# Patient Record
Sex: FEMALE | Race: WHITE | Employment: OTHER | URBAN - METROPOLITAN AREA
[De-identification: names, ages, dates, MRNs, and addresses within clinical notes are randomized per-mention and may not be internally consistent; named-entity substitution may affect disease eponyms.]

---

## 2018-01-09 ENCOUNTER — OFFICE VISIT (OUTPATIENT)
Dept: CARDIOLOGY CLINIC | Age: 68
End: 2018-01-09

## 2018-01-09 VITALS
WEIGHT: 136 LBS | DIASTOLIC BLOOD PRESSURE: 68 MMHG | OXYGEN SATURATION: 98 % | RESPIRATION RATE: 16 BRPM | HEIGHT: 63 IN | HEART RATE: 76 BPM | BODY MASS INDEX: 24.1 KG/M2 | SYSTOLIC BLOOD PRESSURE: 110 MMHG

## 2018-01-09 DIAGNOSIS — I10 ESSENTIAL HYPERTENSION: Primary | ICD-10-CM

## 2018-01-09 NOTE — PROGRESS NOTES
LAST OFFICE VISIT : Visit date not found        ICD-10-CM ICD-9-CM   1. Essential hypertension I10 401.9            Lara Florentino is a 70 y.o. female with diabetes, hypertension and dyslipidemia referred for  1 year follow up. Cardiac risk factors: family history, dyslipidemia, diabetes mellitus, hypertension, post-menopausal  I have personally obtained the history from the patient. HISTORY OF PRESENTING ILLNESS     Overall the pt states she is doing well. The pt reports that she has baseline shortness of breath due to her COPD. She states that she is not using oxygen at home. The pt has lost weight since her last visit and her sugars have been under good control. She notes that she is living with her newly found daughter and is very happy. The patient denies chest pain, orthopnea, PND, LE edema, palpitations, syncope, presyncope or fatigue.          ACTIVE PROBLEM LIST     Patient Active Problem List    Diagnosis Date Noted    Abnormal mammogram with microcalcification 03/23/2015           PAST MEDICAL HISTORY     Past Medical History:   Diagnosis Date    Arthritis     hands    Chronic obstructive pulmonary disease ([address]     Diabetes [facility] [address]     type 2    GERD (gastroesophageal reflux disease)     Hypertension            PAST SURGICAL HISTORY     Past Surgical History:   Procedure Laterality Date    BREAST SURGERY PROCEDURE UNLISTED Left 3/26/15    left breast biopsy    COLONOSCOPY N/A 6/2/2016    COLONOSCOPY performed by Tremaine Bryant MD at [facility] HX BREAST BIOPSY Left 4/7/2015    LEFT BREAST BIOPSY WITH ULTRASOUND performed by Taras Mack MD at [address] HX MOHS PROCEDURES Right 2000    HX ORTHOPAEDIC Left     hand surgery left hand    HX ORTHOPAEDIC Right     CTR    HX ORTHOPAEDIC Right     trigger finger release    HX OTHER SURGICAL  2000    Polyps removed from vocal cords    HX TONSILLECTOMY      as child     [facility] Allergies   Allergen Reactions    Sulfa (Sulfonamide Antibiotics) Itching          FAMILY HISTORY     History reviewed. No pertinent family history. negative for cardiac disease       SOCIAL HISTORY     Social History     Social History    Marital status:      Spouse name: N/A    Number of children: N/A    Years of education: N/A     Social History Main Topics    Smoking status: Former Smoker     Packs/day: 1.50     Years: 30.00     Types: Cigarettes     Quit date: 12/13/2012    Smokeless tobacco: Never Used    Alcohol use No    Drug use: No    Sexual activity: No     Other Topics Concern    None     Social History Narrative         MEDICATIONS     Current Outpatient Prescriptions   Medication Sig    aspirin delayed-release 81 mg tablet Take 81 mg by mouth daily.  atorvastatin (LIPITOR) 10 mg tablet Take 10 mg by mouth nightly.  metFORMIN (GLUCOPHAGE) 1,000 mg tablet Take 1,000 mg by mouth two (2) times daily (with meals).  tiotropium (SPIRIVA WITH HANDIHALER) 18 mcg inhalation capsule Take 1 Cap by inhalation nightly.  fluticasone-salmeterol (ADVAIR DISKUS) 250-50 mcg/dose diskus inhaler Take 1 Puff by inhalation every twelve (12) hours.  albuterol (PROAIR HFA) 90 mcg/actuation inhaler Take  by inhalation.  lisinopril-hydrochlorothiazide (PRINZIDE, ZESTORETIC) 20-12.5 mg per tablet Take 1 Tab by mouth Daily (before breakfast).  citalopram (CELEXA) 20 mg tablet Take 20 mg by mouth Daily (before breakfast).  DOXYLAMINE SUCCINATE (SLEEP AID, DOXYLAMINE, PO) Take 50 mg by mouth nightly.  omeprazole (PRILOSEC) 20 mg capsule Take 20 mg by mouth nightly. No current facility-administered medications for this visit. I have reviewed the nurses notes, vitals, problem list, allergy list, medical history, family, social history and medications. REVIEW OF SYMPTOMS      General: Pt denies excessive weight gain or loss.  Pt is able to conduct ADL's  HEENT: Denies blurred vision, headaches, hearing loss, epistaxis and difficulty swallowing. Respiratory: Denies cough, congestion, shortness of breath, JIMENES, wheezing or stridor. Cardiovascular: Denies precordial pain, palpitations, edema or PND  Gastrointestinal: Denies poor appetite, indigestion, abdominal pain or blood in stool  Genitourinary: Denies hematuria, dysuria, increased urinary frequency  Musculoskeletal: Denies joint pain or swelling from muscles or joints  Neurologic: Denies tremor, paresthesias, headache, or sensory motor disturbance  Psychiatric: Denies confusion, insomnia, depression  Integumentray: Denies rash, itching or ulcers. Hematologic: Denies easy bruising, bleeding     PHYSICAL EXAMINATION      Vitals:    01/09/18 1358   BP: 110/68   Pulse: 76   Resp: 16   SpO2: 98%   Weight: 136 lb (61.7 kg)   Height: 5' 3\" (1.6 m)     General: Well developed, in no acute distress. HEENT: No jaundice, oral mucosa moist, no oral ulcers  Neck: Supple, no stiffness, no lymphadenopathy, supple  Heart:  Normal S1/S2 negative S3 or S4. Regular, no murmur, gallop or rub, no jugular venous distention  Respiratory: Clear bilaterally x 4, no wheezing or rales  Extremities:  No edema, normal cap refill, no cyanosis. Musculoskeletal: No clubbing, no deformities  Neuro: A&Ox3, speech clear, gait stable, cooperative, no focal neurologic deficits  Skin: Skin color is normal. No rashes or lesions. Non diaphoretic, moist.        EKG: NSR with non-specific STT changes. DIAGNOSTIC DATA     1. Lipids  5/20/16- , HDL 59, LDL 86,     2. Echo  9/20/13- EF 55%    3.  Lexiscan  3/2/15- no ischemia         LABORATORY DATA            Lab Results   Component Value Date/Time    WBC 8.0 04/01/2015 09:01 AM    HGB 12.4 04/01/2015 09:01 AM    HCT 37.7 04/01/2015 09:01 AM    PLATELET 820 58/01/8625 09:01 AM    MCV 87.1 04/01/2015 09:01 AM      Lab Results   Component Value Date/Time    Sodium 139 04/01/2015 09:01 AM    Potassium 5.5 04/01/2015 09:01 AM    Chloride 103 04/01/2015 09:01 AM    CO2 28 04/01/2015 09:01 AM    Anion gap 8 04/01/2015 09:01 AM    Glucose 118 04/01/2015 09:01 AM    BUN 27 04/01/2015 09:01 AM    Creatinine 0.84 04/01/2015 09:01 AM    BUN/Creatinine ratio 32 04/01/2015 09:01 AM    GFR est AA >60 04/01/2015 09:01 AM    GFR est non-AA >60 04/01/2015 09:01 AM    Calcium 9.0 04/01/2015 09:01 AM           ASSESSMENT/RECOMMENDATIONS:.      1. Dyspnea/Emphysema  - She is being followed by pulmonary and most of her shortness of breath is non-cardiac related. 2. Dyslipidemia  - Lipids are being followed by Judson Brennan MD and have been at goal.  3. Hypertension  - BP is well controlled in clinic today. I would not make any adjustments to her antihypertensives at this time. I counseled her to continue to exercise and eat a low sodium diet. 4. Diabetes mellitus   - Last HGB A1C was 6.4%, goal should be 6% or below. 5. Return in 1 year or PRN. Orders Placed This Encounter    AMB POC EKG ROUTINE W/ 12 LEADS, INTER & REP     Order Specific Question:   Reason for Exam:     Answer:   htn        Follow-up Disposition:  Return in about 1 year (around 1/9/2019). I have discussed the diagnosis with  Shahnaz Jacome and the intended plan as seen in the above orders. Questions were answered concerning future plans. I have discussed medication side effects and warnings with the patient as well. Thank you,  Judson Brennan MD for involving me in the care of  Shahnaz Jacome. Please do not hesitate to contact me for further questions/concerns. Written by Varinder Bear, as dictated by Evan South MD.     Ondina Rider MD, [facility] [address]      [facility], [address] [place] [address] Drive      [phone] / [phone] Fax

## 2018-01-09 NOTE — PROGRESS NOTES
Chief Complaint   Patient presents with    Follow-up     Htn, elevated lipids. 1. Have you been to the ER, urgent care clinic since your last visit? Hospitalized since your last visit? No    2. Have you seen or consulted any other health care providers outside of the [address] [place] [address] since your last visit? Include any pap smears or colon screening.  No

## 2020-02-21 NOTE — PROGRESS NOTES
LAST OFFICE VISIT : Visit date not found        ICD-10-CM ICD-9-CM   1. Hypertension, unspecified type I10 401.9            Miguel Angel Garrido is a 69 y.o. female with DM, HTN, and dyslipidemia referred for follow up. Cardiac risk factors: family history, dyslipidemia, diabetes mellitus, hypertension, post-menopausal  I have personally obtained the history from the patient. HISTORY OF PRESENTING ILLNESS     Pt is on oxygen. Pt has been on oxygen for about 6-7 months. From a cardiac standpoint, pt is doing well. Pt states she had SOB and saw a pulmonologist in the [facility]. She states the pulmonologist treated with with antibiotics and steroids for a lung infection, but she states she did not have a lung infection, instead she actually had COPD and emphysema. Pt states that during this process she went down to 115 lbs because she was not able to eat and breath at the same time. Pt has been taken off of her DM medication. The patient denies chest pain/ shortness of breath, orthopnea, PND, LE edema, palpitations, syncope, presyncope or fatigue.          ACTIVE PROBLEM LIST     Patient Active Problem List    Diagnosis Date Noted    Abnormal mammogram with microcalcification 03/23/2015           PAST MEDICAL HISTORY     Past Medical History:   Diagnosis Date    Arthritis     hands    Chronic obstructive pulmonary disease ([address]     Diabetes ([facility] [address]     type 2    GERD (gastroesophageal reflux disease)     Hypertension            PAST SURGICAL HISTORY     Past Surgical History:   Procedure Laterality Date    BREAST SURGERY PROCEDURE UNLISTED Left 3/26/15    left breast biopsy    COLONOSCOPY N/A 6/2/2016    COLONOSCOPY performed by Norah Burnham MD at [facility] HX BREAST BIOPSY Left 4/7/2015    LEFT BREAST BIOPSY WITH ULTRASOUND performed by Leroy Chi MD at [address] HX MOHS PROCEDURES Right 2000    HX ORTHOPAEDIC Left     hand surgery left hand    HX ORTHOPAEDIC Right     CTR  HX ORTHOPAEDIC Right     trigger finger release    HX OTHER SURGICAL  2000    Polyps removed from vocal cords    HX TONSILLECTOMY      as child    HX TUBAL LIGATION            ALLERGIES     Allergies   Allergen Reactions    Sulfa (Sulfonamide Antibiotics) Itching          FAMILY HISTORY     No family history on file. negative for cardiac disease       SOCIAL HISTORY     Social History     Socioeconomic History    Marital status:      Spouse name: Not on file    Number of children: Not on file    Years of education: Not on file    Highest education level: Not on file   Tobacco Use    Smoking status: Former Smoker     Packs/day: 2.00     Years: 40.00     Pack years: 80.00     Types: Cigarettes     Last attempt to quit: 2012     Years since quittin.2    Smokeless tobacco: Never Used    Tobacco comment: 80 pack year history. Substance and Sexual Activity    Alcohol use: No    Drug use: No    Sexual activity: Never         MEDICATIONS     Current Outpatient Medications   Medication Sig    hydrOXYzine pamoate (VISTARIL) 25 mg capsule Take 25 mg by mouth three (3) times daily as needed for Itching.  fluticasone-umeclidinium-vilanterol (TRELEGY ELLIPTA) 100-62.5-25 mcg inhaler Take 1 Puff by inhalation daily.  Oxygen 2 Each.  atorvastatin (LIPITOR) 10 mg tablet Take 10 mg by mouth nightly.  albuterol (PROAIR HFA) 90 mcg/actuation inhaler Take  by inhalation.  lisinopril-hydrochlorothiazide (PRINZIDE, ZESTORETIC) 20-12.5 mg per tablet Take 1 Tab by mouth Daily (before breakfast).  citalopram (CELEXA) 20 mg tablet Take 20 mg by mouth Daily (before breakfast).  aspirin delayed-release 81 mg tablet Take 81 mg by mouth daily.  DOXYLAMINE SUCCINATE (SLEEP AID, DOXYLAMINE, PO) Take 50 mg by mouth nightly.  metFORMIN (GLUCOPHAGE) 1,000 mg tablet Take 1,000 mg by mouth two (2) times daily (with meals).     tiotropium (SPIRIVA WITH HANDIHALER) 18 mcg inhalation capsule Take 1 Cap by inhalation nightly.  fluticasone-salmeterol (ADVAIR DISKUS) 250-50 mcg/dose diskus inhaler Take 1 Puff by inhalation every twelve (12) hours.  omeprazole (PRILOSEC) 20 mg capsule Take 20 mg by mouth nightly. No current facility-administered medications for this visit. I have reviewed the nurses notes, vitals, problem list, allergy list, medical history, family, social history and medications. REVIEW OF SYMPTOMS      General: Pt denies excessive weight gain or loss. Pt is able to conduct ADL's  HEENT: Denies blurred vision, headaches, hearing loss, epistaxis and difficulty swallowing. Respiratory: Denies cough, congestion, JIMENES, wheezing or stridor. Positive for SOB  Cardiovascular: Denies precordial pain, palpitations, edema or PND  Gastrointestinal: Denies poor appetite, indigestion, abdominal pain or blood in stool  Genitourinary: Denies hematuria, dysuria, increased urinary frequency  Musculoskeletal: Denies joint pain or swelling from muscles or joints  Neurologic: Denies tremor, paresthesias, headache, or sensory motor disturbance  Psychiatric: Denies confusion, insomnia, depression  Integumentray: Denies rash, itching or ulcers. Hematologic: Denies easy bruising, bleeding     PHYSICAL EXAMINATION      Vitals:    02/24/20 1025   BP: 128/68   Pulse: 73   SpO2: 98%   Weight: 125 lb (56.7 kg)   Height: 5' 2\" (1.575 m)     General: Well developed, in no acute distress. HEENT: No jaundice, oral mucosa moist, no oral ulcers  Neck: Supple, no stiffness, no lymphadenopathy, supple  Heart:  Normal S1/S2 negative S3 or S4. Regular, no murmur, gallop or rub, no jugular venous distention  Respiratory: Clear bilaterally x 4, no wheezing or rales  Abdomen:   Soft, non-tender, bowel sounds are active. Extremities:  No edema, normal cap refill, no cyanosis.   Musculoskeletal: No clubbing, no deformities  Neuro: A&Ox3, speech clear, gait stable, cooperative, no focal neurologic deficits  Skin: Skin color is normal. No rashes or lesions. Non diaphoretic, moist.  Vascular: 2+ pulses symmetric in all extremities        EKG: NSR with nonspecific STT changes     DIAGNOSTIC DATA     1. Lipids  5/20/16- , HDL 59, LDL 86,   8/14/17- , HDL 62, LDL 76, Tg 140    2. Echo  9/20/13- EF 55%    3. Lexiscan  3/2/15- no ischemia         LABORATORY DATA            Lab Results   Component Value Date/Time    WBC 8.0 04/01/2015 09:01 AM    HGB 12.4 04/01/2015 09:01 AM    HCT 37.7 04/01/2015 09:01 AM    PLATELET 307 17/60/0614 09:01 AM    MCV 87.1 04/01/2015 09:01 AM      Lab Results   Component Value Date/Time    Sodium 139 04/01/2015 09:01 AM    Potassium 5.5 (H) 04/01/2015 09:01 AM    Chloride 103 04/01/2015 09:01 AM    CO2 28 04/01/2015 09:01 AM    Anion gap 8 04/01/2015 09:01 AM    Glucose 118 (H) 04/01/2015 09:01 AM    BUN 27 (H) 04/01/2015 09:01 AM    Creatinine 0.84 04/01/2015 09:01 AM    BUN/Creatinine ratio 32 (H) 04/01/2015 09:01 AM    GFR est AA >60 04/01/2015 09:01 AM    GFR est non-AA >60 04/01/2015 09:01 AM    Calcium 9.0 04/01/2015 09:01 AM           ASSESSMENT/RECOMMENDATIONS:.      1. Dyspnea/Emphysema   - Is now on home oxygen continuously   - Has emphysema   - Will check an echo to look at PA pressures   2. Dyslipidemia  - Followed by Victor Manuel Colon MD  - Lipids from 2017 were at goal. Will give her a lab slip to get her cholesterol rechecked. 3. HTN  - Blood pressure is under good control, no adjustments in antihypertensives. Continue low sodium diet. 4. DM  - HGB A1C goal should be 6 or below. 5. Return in 6 months or PRN.      Orders Placed This Encounter    HEPATIC FUNCTION PANEL     Standing Status:   Future     Standing Expiration Date:   2/24/2021    LIPID PANEL     Standing Status:   Future     Standing Expiration Date:   2/24/2021    AMB POC EKG ROUTINE W/ 12 LEADS, INTER & REP     Order Specific Question:   Reason for Exam:     Answer:   htn    hydrOXYzine pamoate (VISTARIL) 25 mg capsule     Sig: Take 25 mg by mouth three (3) times daily as needed for Itching.  fluticasone-umeclidinium-vilanterol (TRELEGY ELLIPTA) 100-62.5-25 mcg inhaler     Sig: Take 1 Puff by inhalation daily.  Oxygen     Si Each. Follow-up and Dispositions  ·   Return in about 6 months (around 2020). I have discussed the diagnosis with  Kim Warren and the intended plan as seen in the above orders. Questions were answered concerning future plans. I have discussed medication side effects and warnings with the patient as well. Thank you,  Xavi Campbell MD for involving me in the care of  Kim Warren. Please do not hesitate to contact me for further questions/concerns. Written by Aureliano Kincaid, as dictated by Dunia Colon MD.     Mason Johnson MD, [facility]    Patient Care Team:  Xavi Campbell MD as PCP - General (Family Practice)    [address], [address] [place] [address]      [phone] / [phone] Fax

## 2020-02-24 ENCOUNTER — OFFICE VISIT (OUTPATIENT)
Dept: CARDIOLOGY CLINIC | Age: 70
End: 2020-02-24

## 2020-02-24 VITALS
WEIGHT: 125 LBS | SYSTOLIC BLOOD PRESSURE: 128 MMHG | OXYGEN SATURATION: 98 % | BODY MASS INDEX: 23 KG/M2 | HEIGHT: 62 IN | HEART RATE: 73 BPM | DIASTOLIC BLOOD PRESSURE: 68 MMHG

## 2020-02-24 DIAGNOSIS — I10 HYPERTENSION, UNSPECIFIED TYPE: Primary | ICD-10-CM

## 2020-02-24 RX ORDER — HYDROXYZINE PAMOATE 25 MG/1
25 CAPSULE ORAL
COMMUNITY
End: 2021-08-17

## 2020-02-24 NOTE — LETTER
2/25/20 Patient: Catarina Martinez YOB: 1950 Date of Visit: 2/24/2020 Abner Mendenhall, [address] [facility] [address] [facility] [address] [place] [address] VIA Facsimile: [phone] Dear Abner Mendenhall DO, Thank you for referring Ms. Catarina Martinez to [facility] for evaluation. My notes for this consultation are attached. If you have questions, please do not hesitate to call me. I look forward to following your patient along with you.  
 
 
Sincerely, 
 
Eladio Curtis MD

## 2020-02-24 NOTE — PROGRESS NOTES
Chief Complaint   Patient presents with    Hypertension    Follow-up     Visit Vitals  /68 (BP 1 Location: Left arm, BP Patient Position: Sitting)   Pulse 73   Ht 5' 2\" (1.575 m)   Wt 125 lb (56.7 kg)   SpO2 98%   BMI 22.86 kg/m²     SOB JIMENES

## 2020-02-29 LAB
ALBUMIN SERPL-MCNC: 4.3 G/DL (ref 3.7–4.7)
ALP SERPL-CCNC: 60 IU/L (ref 39–117)
ALT SERPL-CCNC: 18 IU/L (ref 0–32)
AST SERPL-CCNC: 15 IU/L (ref 0–40)
BILIRUB DIRECT SERPL-MCNC: 0.11 MG/DL (ref 0–0.4)
BILIRUB SERPL-MCNC: 0.4 MG/DL (ref 0–1.2)
CHOLEST SERPL-MCNC: 155 MG/DL (ref 100–199)
HDLC SERPL-MCNC: 64 MG/DL
LDLC SERPL CALC-MCNC: 70 MG/DL (ref 0–99)
PROT SERPL-MCNC: 6.4 G/DL (ref 6–8.5)
SPECIMEN STATUS REPORT, ROLRST: NORMAL
TRIGL SERPL-MCNC: 105 MG/DL (ref 0–149)
VLDLC SERPL CALC-MCNC: 21 MG/DL (ref 5–40)

## 2020-09-29 ENCOUNTER — HOSPITAL ENCOUNTER (OUTPATIENT)
Dept: GENERAL RADIOLOGY | Age: 70
Discharge: HOME | End: 2020-09-29
Payer: MEDICARE

## 2020-09-29 ENCOUNTER — TRANSCRIBE ORDER (OUTPATIENT)
Dept: REGISTRATION | Age: 70
End: 2020-09-29

## 2020-09-29 DIAGNOSIS — J44.9 COPD (CHRONIC OBSTRUCTIVE PULMONARY DISEASE) (HCC): ICD-10-CM

## 2020-09-29 DIAGNOSIS — J44.9 COPD (CHRONIC OBSTRUCTIVE PULMONARY DISEASE) (HCC): Primary | ICD-10-CM

## 2020-09-29 PROCEDURE — 71046 X-RAY EXAM CHEST 2 VIEWS: CPT

## 2021-02-09 ENCOUNTER — TRANSCRIBE ORDER (OUTPATIENT)
Dept: SCHEDULING | Age: 71
End: 2021-02-09

## 2021-02-09 DIAGNOSIS — Z12.31 VISIT FOR SCREENING MAMMOGRAM: Primary | ICD-10-CM

## 2021-03-12 ENCOUNTER — HOSPITAL ENCOUNTER (OUTPATIENT)
Dept: MAMMOGRAPHY | Age: 71
Discharge: HOME | End: 2021-03-12
Payer: MEDICARE

## 2021-03-12 DIAGNOSIS — Z12.31 VISIT FOR SCREENING MAMMOGRAM: ICD-10-CM

## 2021-03-12 PROCEDURE — 77063 BREAST TOMOSYNTHESIS BI: CPT

## 2021-08-10 ENCOUNTER — TRANSCRIBE ORDER (OUTPATIENT)
Dept: RESPIRATORY THERAPY | Age: 71
End: 2021-08-10

## 2021-08-10 DIAGNOSIS — J44.9 CHRONIC OBSTRUCTIVE PULMONARY DISEASE, UNSPECIFIED COPD TYPE (HCC): Primary | ICD-10-CM

## 2021-08-13 ENCOUNTER — TRANSCRIBE ORDER (OUTPATIENT)
Dept: REGISTRATION | Age: 71
End: 2021-08-13

## 2021-08-13 ENCOUNTER — HOSPITAL ENCOUNTER (OUTPATIENT)
Dept: GENERAL RADIOLOGY | Age: 71
Discharge: HOME | End: 2021-08-13
Payer: MEDICARE

## 2021-08-13 ENCOUNTER — HOSPITAL ENCOUNTER (OUTPATIENT)
Dept: PULMONOLOGY | Age: 71
Discharge: HOME | End: 2021-08-13
Payer: MEDICARE

## 2021-08-13 DIAGNOSIS — J44.9 COPD (CHRONIC OBSTRUCTIVE PULMONARY DISEASE) (HCC): Primary | ICD-10-CM

## 2021-08-13 DIAGNOSIS — J44.9 COPD (CHRONIC OBSTRUCTIVE PULMONARY DISEASE) (HCC): ICD-10-CM

## 2021-08-13 LAB — HBA1C MFR BLD HPLC: 6 %

## 2021-08-13 PROCEDURE — 71046 X-RAY EXAM CHEST 2 VIEWS: CPT

## 2021-08-13 PROCEDURE — 94729 DIFFUSING CAPACITY: CPT

## 2021-08-13 PROCEDURE — 94060 EVALUATION OF WHEEZING: CPT

## 2021-08-13 PROCEDURE — 94726 PLETHYSMOGRAPHY LUNG VOLUMES: CPT

## 2021-08-16 ENCOUNTER — TRANSCRIBE ORDER (OUTPATIENT)
Dept: SCHEDULING | Age: 71
End: 2021-08-16

## 2021-08-16 DIAGNOSIS — M85.9 DISORDER OF BONE DENSITY AND STRUCTURE, UNSPECIFIED: Primary | ICD-10-CM

## 2021-08-17 ENCOUNTER — OFFICE VISIT (OUTPATIENT)
Dept: CARDIOLOGY CLINIC | Age: 71
End: 2021-08-17
Payer: MEDICARE

## 2021-08-17 VITALS
HEIGHT: 62 IN | DIASTOLIC BLOOD PRESSURE: 70 MMHG | BODY MASS INDEX: 25.21 KG/M2 | HEART RATE: 85 BPM | WEIGHT: 137 LBS | SYSTOLIC BLOOD PRESSURE: 118 MMHG

## 2021-08-17 DIAGNOSIS — I10 HYPERTENSION, UNSPECIFIED TYPE: Primary | ICD-10-CM

## 2021-08-17 PROCEDURE — G8536 NO DOC ELDER MAL SCRN: HCPCS | Performed by: SPECIALIST

## 2021-08-17 PROCEDURE — G8399 PT W/DXA RESULTS DOCUMENT: HCPCS | Performed by: SPECIALIST

## 2021-08-17 PROCEDURE — 1101F PT FALLS ASSESS-DOCD LE1/YR: CPT | Performed by: SPECIALIST

## 2021-08-17 PROCEDURE — 93005 ELECTROCARDIOGRAM TRACING: CPT | Performed by: SPECIALIST

## 2021-08-17 PROCEDURE — G9899 SCRN MAM PERF RSLTS DOC: HCPCS | Performed by: SPECIALIST

## 2021-08-17 PROCEDURE — G8419 CALC BMI OUT NRM PARAM NOF/U: HCPCS | Performed by: SPECIALIST

## 2021-08-17 PROCEDURE — G0463 HOSPITAL OUTPT CLINIC VISIT: HCPCS | Performed by: SPECIALIST

## 2021-08-17 PROCEDURE — 1090F PRES/ABSN URINE INCON ASSESS: CPT | Performed by: SPECIALIST

## 2021-08-17 PROCEDURE — 99214 OFFICE O/P EST MOD 30 MIN: CPT | Performed by: SPECIALIST

## 2021-08-17 PROCEDURE — 93010 ELECTROCARDIOGRAM REPORT: CPT | Performed by: SPECIALIST

## 2021-08-17 PROCEDURE — G8427 DOCREV CUR MEDS BY ELIG CLIN: HCPCS | Performed by: SPECIALIST

## 2021-08-17 PROCEDURE — 3017F COLORECTAL CA SCREEN DOC REV: CPT | Performed by: SPECIALIST

## 2021-08-17 PROCEDURE — G8432 DEP SCR NOT DOC, RNG: HCPCS | Performed by: SPECIALIST

## 2021-08-17 RX ORDER — ACETAMINOPHEN 500 MG
TABLET ORAL
COMMUNITY

## 2021-08-17 RX ORDER — METHYLPREDNISOLONE 4 MG/1
TABLET ORAL
COMMUNITY
End: 2021-08-23

## 2021-08-17 NOTE — PROGRESS NOTES
CARDIOLOGY OFFICE NOTE    Nicholas Santizo MD, [address], [place], [address] [facility]  Phone [phone]; Fax [phone]  Mobile [phone]   Voice Mail [phone]      LAST OFFICE VISIT : Visit date not found        ICD-10-CM ICD-9-CM   1. Hypertension, unspecified type  I10 401.9            Diamond Barnard is a 66 y.o. female with  HTN, and dyslipidemia referred for follow up. Cardiac risk factors: family history, dyslipidemia, diabetes mellitus, hypertension, post-menopausal  I have personally obtained the history from the patient. HISTORY OF PRESENTING ILLNESS   She states she is having surgery on her right thumb. She has no chest pain but has short baseline shortness of breath. Shortness of breath secondary to history of emphysema. She denies any exertional chest discomfort though. She has on continuous oxygen.        ACTIVE PROBLEM LIST     Patient Active Problem List    Diagnosis Date Noted    Abnormal mammogram with microcalcification 03/23/2015           PAST MEDICAL HISTORY     Past Medical History:   Diagnosis Date    Arthritis     hands    Chronic [facility] disease ([facility]     COVID-19 vaccine series completed     Pfizer    Diabetes ([facility]     type 2    GERD (gastroesophageal reflux disease)     Hypertension            PAST SURGICAL HISTORY     Past Surgical History:   Procedure Laterality Date    COLONOSCOPY N/A 6/2/2016    COLONOSCOPY performed by Chilango Pedersen MD at [address] [facility] HX BREAST BIOPSY Left 4/7/2015    LEFT BREAST BIOPSY WITH ULTRASOUND performed by Uziel Jeffries MD at [address] HX MOHS PROCEDURES Right 2000    HX ORTHOPAEDIC Left     hand surgery left hand    HX ORTHOPAEDIC Right     CTR    HX ORTHOPAEDIC Right     trigger finger release    HX OTHER SURGICAL  2000    Polyps removed from vocal cords    HX TONSILLECTOMY      as child    HX TUBAL LIGATION      [address] Left 3/26/15    left breast biopsy          ALLERGIES     Allergies   Allergen Reactions    Sulfa (Sulfonamide Antibiotics) Itching          FAMILY HISTORY     No family history on file. negative for cardiac disease       SOCIAL HISTORY     Social History     Socioeconomic History    Marital status:      Spouse name: Not on file    Number of children: Not on file    Years of education: Not on file    Highest education level: Not on file   Tobacco Use    Smoking status: Former Smoker     Packs/day: 2.00     Years: 40.00     Pack years: 80.00     Types: Cigarettes     Quit date: 2012     Years since quittin.6    Smokeless tobacco: Never Used    Tobacco comment: 80 pack year history. Substance and Sexual Activity    Alcohol use: No    Drug use: No    Sexual activity: Never     Social Determinants of Health     Financial Resource Strain:     Difficulty of Paying Living Expenses:    Food Insecurity:     Worried About Running Out of Food in the Last Year:     [address] in the Last Year:    Transportation Needs:     Lack of Transportation (Medical):  Lack of Transportation (Non-Medical):    Physical Activity:     Days of Exercise per Week:     Minutes of Exercise per Session:    Stress:     Feeling of Stress :    Social Connections:     Frequency of Communication with Friends and Family:     Frequency of Social Gatherings with Friends and Family:     Attends Scientologist Services:     Active Member of Clubs or Organizations:     Attends Club or Organization Meetings:     Marital Status:          MEDICATIONS     Current Outpatient Medications   Medication Sig    acetaminophen (TYLENOL) 500 mg tablet Take  by mouth every six (6) hours as needed for Pain.  methylPREDNISolone (MEDROL) 4 mg tab Take  by mouth daily (with breakfast).  fluticasone-umeclidinium-vilanterol (TRELEGY ELLIPTA) 100-62.5-25 mcg inhaler Take 1 Puff by inhalation daily.  Oxygen 2 Each.     atorvastatin (LIPITOR) 10 mg tablet Take 10 mg by mouth nightly.  lisinopril-hydrochlorothiazide (PRINZIDE, ZESTORETIC) 20-12.5 mg per tablet Take 1 Tab by mouth Daily (before breakfast).  citalopram (CELEXA) 20 mg tablet Take 20 mg by mouth Daily (before breakfast).  hydrOXYzine pamoate (VISTARIL) 25 mg capsule Take 25 mg by mouth three (3) times daily as needed for Itching. (Patient not taking: Reported on 8/17/2021)    aspirin delayed-release 81 mg tablet Take 81 mg by mouth daily. (Patient not taking: Reported on 8/17/2021)    DOXYLAMINE SUCCINATE (SLEEP AID, DOXYLAMINE, PO) Take 50 mg by mouth nightly. (Patient not taking: Reported on 8/17/2021)    metFORMIN (GLUCOPHAGE) 1,000 mg tablet Take 1,000 mg by mouth two (2) times daily (with meals). (Patient not taking: Reported on 8/17/2021)    tiotropium (SPIRIVA WITH HANDIHALER) 18 mcg inhalation capsule Take 1 Cap by inhalation nightly. (Patient not taking: Reported on 8/17/2021)    fluticasone-salmeterol (ADVAIR DISKUS) 250-50 mcg/dose diskus inhaler Take 1 Puff by inhalation every twelve (12) hours. (Patient not taking: Reported on 8/17/2021)    albuterol (PROAIR HFA) 90 mcg/actuation inhaler Take  by inhalation. (Patient not taking: Reported on 8/17/2021)    omeprazole (PRILOSEC) 20 mg capsule Take 20 mg by mouth nightly. (Patient not taking: Reported on 8/17/2021)     No current facility-administered medications for this visit. I have reviewed the nurses notes, vitals, problem list, allergy list, medical history, family, social history and medications. REVIEW OF SYMPTOMS   Positives per HPI  General: Pt denies excessive weight gain or loss. Pt is able to conduct ADL's  HEENT: Denies blurred vision, headaches, hearing loss, epistaxis and difficulty swallowing. Respiratory: Denies cough, congestion, JIMENES, wheezing or stridor.  Positive for SOB  Cardiovascular: Denies precordial pain, palpitations, edema or PND  Gastrointestinal: Denies poor appetite, indigestion, abdominal pain or blood in stool  Genitourinary: Denies hematuria, dysuria, increased urinary frequency  Musculoskeletal: Denies joint pain or swelling from muscles or joints  Neurologic: Denies tremor, paresthesias, headache, or sensory motor disturbance  Psychiatric: Denies confusion, insomnia, depression  Integumentray: Denies rash, itching or ulcers. Hematologic: Denies easy bruising, bleeding     PHYSICAL EXAMINATION      Vitals:    08/17/21 1415   BP: 118/70   Pulse: 85   Weight: 137 lb (62.1 kg)   Height: 5' 2\" (1.575 m)     General: Well developed, in no acute distress. HEENT: No jaundice, oral mucosa moist, no oral ulcers  Neck: Supple, no stiffness, no lymphadenopathy, supple  Heart:   Regular  rate and rhythm  Respiratory: Reduced breath sounds throughout  Abdomen:   Soft, non-tender, bowel sounds are active. Extremities:  No edema, normal cap refill, no cyanosis. Musculoskeletal: No clubbing, no deformities  Neuro: A&Ox3, speech clear, gait stable, cooperative, no focal neurologic deficits  Skin: Skin color is normal. No rashes or lesions. Non diaphoretic, moist.          EKG: NSR with nonspecific STT changes     DIAGNOSTIC DATA     1. Lipids   5/20/16- , HDL 59, LDL 86,    8/14/17- , HDL 62, LDL 76, Tg 140   2/28/20- , HDL 64, LDL 70,    8/28/20- , HDL 61, LDL 77,    2/9/21- , HDL 58, LDL 87,    8/13/21- , HDL 66, LDL 88, TG 85    2. Echo   9/20/13- EF 55%   3/4/20-EF 50 - 55%, Aortic valve sclerosis with no evidence of reduced excursion. Aortic valve mean gradient is 4.7 mmHg. Aortic valve area is 1.5 cm2. Mild aortic valve stenosis   Mild PI     3.  Lexiscan   3/2/15- no ischemia         LABORATORY DATA            Lab Results   Component Value Date/Time    WBC 8.0 04/01/2015 09:01 AM    HGB 12.4 04/01/2015 09:01 AM    HCT 37.7 04/01/2015 09:01 AM    PLATELET 677 45/56/2258 09:01 AM    MCV 87.1 04/01/2015 09:01 AM      Lab Results   Component Value Date/Time    Sodium 139 04/01/2015 09:01 AM    Potassium 5.5 (H) 04/01/2015 09:01 AM    Chloride 103 04/01/2015 09:01 AM    CO2 28 04/01/2015 09:01 AM    Anion gap 8 04/01/2015 09:01 AM    Glucose 118 (H) 04/01/2015 09:01 AM    BUN 27 (H) 04/01/2015 09:01 AM    Creatinine 0.84 04/01/2015 09:01 AM    BUN/Creatinine ratio 32 (H) 04/01/2015 09:01 AM    GFR est AA >60 04/01/2015 09:01 AM    GFR est non-AA >60 04/01/2015 09:01 AM    Calcium 9.0 04/01/2015 09:01 AM    Bilirubin, total 0.4 02/28/2020 10:10 AM    Alk. phosphatase 60 02/28/2020 10:10 AM    Protein, total 6.4 02/28/2020 10:10 AM    Albumin 4.3 02/28/2020 10:10 AM    ALT (SGPT) 18 02/28/2020 10:10 AM           ASSESSMENT/RECOMMENDATIONS:.      1. Dyspnea/Emphysema   - Is now on home oxygen continuously    2. Dyslipidemia  - Followed by Kai Feliciano MD  - Lipids from 2017 were at goal. Will give her a lab slip to get her cholesterol rechecked. It was at goal at 88  3. HTN  - Blood pressure is under good control, no adjustments in antihypertensives. Continue low sodium diet. 4.  Cardiac risk stratification for noncardiac surgery  -I believe she is a low to mild risk of a cardiac event mostly secondary to her emphysema. she will be having a  regional block with MAC and may proceed. Her last echo was in 2020 with a EF of 50 to 55% and mild aortic stenosis aortic valve area of 1.5 cm². Her last stress test was in 2015 and that was normal.  Her EKG today is normal sinus rhythm  5. Return in 6 months or PRN. Orders Placed This Encounter    AMB POC EKG ROUTINE W/ 12 LEADS, INTER & REP     Order Specific Question:   Reason for Exam:     Answer:   HTN    acetaminophen (TYLENOL) 500 mg tablet     Sig: Take  by mouth every six (6) hours as needed for Pain.  methylPREDNISolone (MEDROL) 4 mg tab     Sig: Take  by mouth daily (with breakfast).           Follow-up and Dispositions  ·   Return in about 6 months (around 2/17/2022). I have discussed the diagnosis with  Joyce Masters and the intended plan as seen in the above orders. Questions were answered concerning future plans. I have discussed medication side effects and warnings with the patient as well. Thank you,  Lisa Louie MD for involving me in the care of  Joyce Masters. Please do not hesitate to contact me for further questions/concerns. Nicholas Herr MD, [facility]    Patient Care Team:  Lisa Louie MD as PCP - General (Family Medicine)    [address], [address] [place] [address]      [phone] / [phone] Fax

## 2021-08-17 NOTE — PROGRESS NOTES
Clark Damico is a 66 y.o. female    Visit Vitals  /70 (BP 1 Location: Left upper arm, BP Patient Position: Sitting, BP Cuff Size: Adult)   Pulse 85   Ht 5' 2\" (1.575 m)   Wt 137 lb (62.1 kg)   BMI 25.06 kg/m²       Chief Complaint   Patient presents with    Cholesterol Problem    Hypertension    Pre-op Exam       Chest pain NO  SOB NO  Dizziness NO  Swelling LEFT FOOT  Recent hospital visit NO  Refills NO  COVID VACCINE STATUS YES all other ROS negative except as per HPI

## 2021-08-23 ENCOUNTER — HOSPITAL ENCOUNTER (OUTPATIENT)
Dept: PREADMISSION TESTING | Age: 71
Discharge: HOME | End: 2021-08-23
Payer: MEDICARE

## 2021-08-23 VITALS
WEIGHT: 136.4 LBS | SYSTOLIC BLOOD PRESSURE: 137 MMHG | RESPIRATION RATE: 20 BRPM | DIASTOLIC BLOOD PRESSURE: 64 MMHG | OXYGEN SATURATION: 97 % | BODY MASS INDEX: 25.75 KG/M2 | HEIGHT: 61 IN | HEART RATE: 70 BPM | TEMPERATURE: 97 F

## 2021-08-23 LAB
ANION GAP SERPL CALC-SCNC: 1 MMOL/L (ref 5–15)
BUN SERPL-MCNC: 39 MG/DL (ref 6–20)
BUN/CREAT SERPL: 27 (ref 12–20)
CALCIUM SERPL-MCNC: 9.3 MG/DL (ref 8.5–10.1)
CHLORIDE SERPL-SCNC: 107 MMOL/L (ref 97–108)
CO2 SERPL-SCNC: 31 MMOL/L (ref 21–32)
CREAT SERPL-MCNC: 1.46 MG/DL (ref 0.55–1.02)
GLUCOSE SERPL-MCNC: 79 MG/DL (ref 65–100)
POTASSIUM SERPL-SCNC: 4.8 MMOL/L (ref 3.5–5.1)
SODIUM SERPL-SCNC: 139 MMOL/L (ref 136–145)

## 2021-08-23 PROCEDURE — 80048 BASIC METABOLIC PNL TOTAL CA: CPT

## 2021-08-23 PROCEDURE — 36415 COLL VENOUS BLD VENIPUNCTURE: CPT

## 2021-08-23 RX ORDER — FLUTICASONE FUROATE, UMECLIDINIUM BROMIDE AND VILANTEROL TRIFENATATE 100; 62.5; 25 UG/1; UG/1; UG/1
1 POWDER RESPIRATORY (INHALATION) DAILY
COMMUNITY

## 2021-08-23 RX ORDER — LISINOPRIL 30 MG/1
30 TABLET ORAL
COMMUNITY

## 2021-08-23 NOTE — PERIOP NOTES
[facility], [address] [facility]   MAIN OR                                  [phone]   MAIN PRE OP                          [phone]                                                                                AMBULATORY PRE OP          [phone]  PRE-ADMISSION TESTING    [phone]   Surgery Date: Wednesday 9/1/21        Is surgery arrival time given by surgeon? NO  If NO, [facility] staff will call you between 3 and 7pm the day before your surgery with your arrival time. (If your surgery is on a Monday, we will call you the Friday before.)    Call [phone] after 7pm Monday-Friday if you did not receive this call. INSTRUCTIONS BEFORE YOUR SURGERY   When You  Arrive Arrive at the 2[address] [facility] on the day of your surgery  Have your insurance card, photo ID, and any copayment (if needed)   Food   and   Drink NO food or drink after midnight the night before surgery    This means NO water, gum, mints, coffee, juice, etc.  No alcohol (beer, wine, liquor) 24 hours before and after surgery   Medications to   TAKE   Morning of Surgery MEDICATIONS TO TAKE THE MORNING OF SURGERY WITH A SIP OF WATER:    Ventolin as needed   Trelegy Ellipta inhaler      Medications  To  STOP      7 days before surgery  Non-Steroidal anti-inflammatory Drugs (NSAID's): for example, Ibuprofen (Advil, Motrin), Naproxen (Aleve)   Aspirin, if taking for pain    Herbal supplements, vitamins, and fish oil   Other:  (Pain medications not listed above, including Tylenol may be taken)   Blood  Thinners     Bathing Clothing  Jewelry  Valuables      If you shower the morning of surgery, please do not apply anything to your skin (lotions, powders, deodorant, or makeup, especially mascara)   Follow Chlorhexidine [employer] body wash instructions provided to you during PAT appointment. Begin 3 days prior to surgery.    Do not shave or trim anywhere 24 hours before surgery   Wear your hair loose or down; no pony-tails, buns, or metal hair clips   Wear loose, comfortable, clean clothes   Wear glasses instead of contacts  [address], valuables, and jewelry, including body piercings, at home   If you were given an [employer], bring it on day of surgery. Going Home - or Spending the Night  SAME-DAY SURGERY: You must have a responsible adult drive you home and stay with you 24 hours after surgery   ADMITS: If your doctor is keeping you in the hospital after surgery, leave personal belongings/luggage in your car until you have a hospital room number. Hospital discharge time is 12 noon  Drivers must be here before 12 noon unless you are told differently   Special Instructions It is now mandated that all surgical patients be tested for COVID-19 prior to surgery. Testing has to be exactly 4 days prior to surgery. Your COVID test date is Friday 8/27/21 between 8:00 am and 11:00 am.       COVID testing will be performed curbside at the [facility] [address]. There will be signs leading you to the testing site. You will need to bring a photo ID with you to be swabbed. Patients are advised to self-quarantine at home after testing and prior to your surgery date. You will be notified if your results are positive.     What to watch for:   Coronavirus (COVID-19) affects different people in different ways   It also appears with a wide range of symptoms from mild to severe   Signs usually appear 2-14 days after exposure     If you develop any of the following, notify your doctor immediately:  o Fever  o Chills, with or without a shiver  o Muscle pain  o Headache  o Sore throat  o Dry cough  o New loss of taste or smell  o Tiredness      If you develop any of the following, call 911:  o Shortness of breath  o Difficulty breathing  o Chest pain  o New confusion  o Blueness of fingers and/or lips       Follow all instructions so your surgery wont be cancelled. Please, be on time. If a situation occurs and you are delayed the day of surgery, call  [phone]. If your physical condition changes (like a fever, cold, flu, etc.) call your surgeon. Home medication(s) reviewed and verified via  LIST   VERBAL   during PAT appointment. The patient was contacted by      IN-PERSON    The patient verbalizes understanding of all instructions and      DOES NOT   need reinforcement.

## 2021-08-23 NOTE — H&P
History and Physical    Patient: Joyce Masters MRN: [id number]  SSN: [id number]    YOB: 1950  Age: 66 y.o. Sex: female      CC: Right thumb pain    Subjective:      Joyce Masters is a 66 y.o. female referred for pre-operative evaluation by Dr. La Manuel for surgery on 9/1/21. Tammie Taylor notes her right thumb has been painful for many years. She has lost  strength in her hand. She is RHD. She denies numbness. She notes she replaced her left thumb and did great. She has history of emphysema and is followed by Dr. George Marie. Last visit was in August of 2021 and it notes she is stable. She is currently on 2L of oxygen continous. She is also followed by Dr. Yong Herr for her hx of PVC's. Last office visit was 8/17/21. The patient was evaluated in the surgeon's office and it was determined that the most appropriate plan of care is to proceed with surgical intervention. Patient's PCP Lisa Louie MD    Past Medical History:   Diagnosis Date    Chronic obstructive pulmonary disease ([address]     COVID-19 07/2020    COVID-19 vaccine series completed 2021    Pfizer    Emphysema lung ([facility]     Hand arthritis     Hypertension     Oxygen dependent     2 L via NC    PVC (premature ventricular contraction)     Followed by Dr. Jed Hein Renal insufficiency 08/23/2021     Past Surgical History:   Procedure Laterality Date    COLONOSCOPY N/A 6/2/2016    [facility] Lizbeth [facility]han [facility] HX ARTHROPLASTY Left     Thumb    HX BREAST BIOPSY Left 4/7/2015    LEFT BREAST BIOPSY WITH ULTRASOUND performed by [facility] Isaac MD at [facility] [address] H[address] [facility] Right     HX ORTHOPAEDIC Right     trigger finger release    HX OTHER SURGICAL  2000    Polyps removed from vocal cords    HX TONSILLECTOMY  1955    HX TUBAL LIGATION        No family history on file.   Social History     Tobacco Use    Smoking status: Former Smoker     Packs/day: 2.00 Years: 40.00     Pack years: 80.00     Types: Cigarettes     Quit date: 2012     Years since quittin.7    Smokeless tobacco: Never Used    Tobacco comment: 80 pack year history. Substance Use Topics    Alcohol use: No    Drug use: No      Prior to Admission medications    Medication Sig Start Date End Date Taking? Authorizing Provider   lisinopriL (PRINIVIL, ZESTRIL) 30 mg tablet Take 30 mg by mouth every morning. Yes Provider, Historical   fluticasone-umeclidinium-vilanterol (Trelegy Ellipta) 100-62.5-25 mcg inhaler Take 1 Puff by inhalation daily. Yes Provider, Historical   acetaminophen (TYLENOL) 500 mg tablet Take  by mouth every six (6) hours as needed for Pain. Yes Provider, Historical   Oxygen 2 Each. Yes Provider, Historical   atorvastatin (LIPITOR) 10 mg tablet Take 10 mg by mouth nightly. Yes Provider, Historical   albuterol (PROAIR HFA) 90 mcg/actuation inhaler Take  by inhalation. Yes Provider, Historical   citalopram (CELEXA) 20 mg tablet Take 20 mg by mouth every evening. Yes Provider, Historical        Allergies   Allergen Reactions    Sulfa (Sulfonamide Antibiotics) Itching       Review of Systems:  Constitutional: Negative for chills and fever  HENT: Negative for congestion and sore throat  Eyes: negative for blurred vision and double vision  Respiratory: Negative for cough, shortness of breath and wheezing  Mouth: Negative for loose, broken or chipped teeth. Cardiovascular: Negative for chest pain and palpitations  Gastrointestinal: Negative for abdominal pain, constipation, diarrhea and nausea  Genitourinary: Negative for dysuria and hematuria  Musculoskeletal: Right thumb pain  Skin: Negative for rash, open wounds.    Neurological: Negative for dizziness, tremors and headaches  Psychiatric: Negative for anxiety    Objective:     Vitals:    21 1055   BP: 137/64   Pulse: 70   Resp: 20   Temp: 97 °F (36.1 °C)   SpO2: 97%   Weight: 61.9 kg (136 lb 6.4 oz) Height: 5' 1\" (1.549 m)        Physical Exam:  General Appearance: Alert, Well Appearing and in no distress  Mental Status: Normal mood, behavior, speech and dress  Neck: Normal appearance externally  Ears: External canal no drainage  Nose: Normal external appearance and no drainage   Chest: Clear to auscultation, no wheezes, rales or rhonchi  Heart: Normal rate, regular rhythm, no murmurs, rubs, clicks or gallops  Skin: Normal color, no lesions externally  Abdomen: Not examined  Neuro: Not examined  Musculoskeletal: Normal gait.  weaker right hand    Recent Results (from the past 168 hour(s))   METABOLIC PANEL, BASIC    Collection Time: 08/23/21 11:47 AM   Result Value Ref Range    Sodium 139 136 - 145 mmol/L    Potassium 4.8 3.5 - 5.1 mmol/L    Chloride 107 97 - 108 mmol/L    CO2 31 21 - 32 mmol/L    Anion gap 1 (L) 5 - 15 mmol/L    Glucose 79 65 - 100 mg/dL    BUN 39 (H) 6 - 20 MG/DL    Creatinine 1.46 (H) 0.55 - 1.02 MG/DL    BUN/Creatinine ratio 27 (H) 12 - 20      GFR est AA 43 (L) >60 ml/min/1.73m2    GFR est non-AA 35 (L) >60 ml/min/1.73m2    Calcium 9.3 8.5 - 10.1 MG/DL         Assessment:     OA Right Thumb  Pre-Operative Evaluation    Plan:     Right thumb arthroplasty  Labs and EKG reviewed. Last pulmonary OV reviewed  Last Cardiology OV reviewed. EKG done in office.        Signed By: Jordan Garcia NP     August 24, 2021

## 2021-08-25 NOTE — PERIOP NOTES
Call to Perry Falcon,2[address] Floor office to request updated cardiac clearance (not local anesthesia) and final EKG reading. Message will be given to Dr. Perry Falcon,2[address] Floor nurse.

## 2021-08-27 ENCOUNTER — HOSPITAL ENCOUNTER (OUTPATIENT)
Dept: PREADMISSION TESTING | Age: 71
Discharge: HOME | End: 2021-08-27
Payer: MEDICARE

## 2021-08-27 PROCEDURE — U0005 INFEC AGEN DETEC AMPLI PROBE: HCPCS

## 2021-08-28 LAB
SARS-COV-2, XPLCVT: NOT DETECTED
SOURCE, COVRS: NORMAL

## 2021-08-31 ENCOUNTER — ANESTHESIA EVENT (OUTPATIENT)
Dept: SURGERY | Age: 71
End: 2021-08-31
Payer: MEDICARE

## 2021-09-01 ENCOUNTER — HOSPITAL ENCOUNTER (OUTPATIENT)
Age: 71
Setting detail: OUTPATIENT SURGERY
Discharge: HOME | End: 2021-09-01
Attending: ORTHOPAEDIC SURGERY | Admitting: ORTHOPAEDIC SURGERY
Payer: MEDICARE

## 2021-09-01 ENCOUNTER — ANESTHESIA (OUTPATIENT)
Dept: SURGERY | Age: 71
End: 2021-09-01
Payer: MEDICARE

## 2021-09-01 VITALS
HEART RATE: 58 BPM | WEIGHT: 136.69 LBS | TEMPERATURE: 97.6 F | SYSTOLIC BLOOD PRESSURE: 158 MMHG | BODY MASS INDEX: 25.15 KG/M2 | HEIGHT: 62 IN | RESPIRATION RATE: 17 BRPM | DIASTOLIC BLOOD PRESSURE: 81 MMHG | OXYGEN SATURATION: 97 %

## 2021-09-01 PROCEDURE — 77030002996 HC SUT SLK J&J -A: Performed by: ORTHOPAEDIC SURGERY

## 2021-09-01 PROCEDURE — 74011000250 HC RX REV CODE- 250: Performed by: ORTHOPAEDIC SURGERY

## 2021-09-01 PROCEDURE — 76030000001 HC AMB SURG OR TIME 1 TO 1.5: Performed by: ORTHOPAEDIC SURGERY

## 2021-09-01 PROCEDURE — 77030006690 HC BLD OPHTH BVR BD -B: Performed by: ORTHOPAEDIC SURGERY

## 2021-09-01 PROCEDURE — 77030002966 HC SUT PDS J&J -A: Performed by: ORTHOPAEDIC SURGERY

## 2021-09-01 PROCEDURE — 2709999900 HC NON-CHARGEABLE SUPPLY: Performed by: ORTHOPAEDIC SURGERY

## 2021-09-01 PROCEDURE — 77030002922 HC SUT FBRWRE ARTH -B: Performed by: ORTHOPAEDIC SURGERY

## 2021-09-01 PROCEDURE — 77030010777 HC CRDL FT DERY -B

## 2021-09-01 PROCEDURE — 77030002916 HC SUT ETHLN J&J -A: Performed by: ORTHOPAEDIC SURGERY

## 2021-09-01 PROCEDURE — 74011000272 HC RX REV CODE- 272: Performed by: ORTHOPAEDIC SURGERY

## 2021-09-01 PROCEDURE — 76060000062 HC AMB SURG ANES 1 TO 1.5 HR: Performed by: ORTHOPAEDIC SURGERY

## 2021-09-01 PROCEDURE — 74011250636 HC RX REV CODE- 250/636: Performed by: STUDENT IN AN ORGANIZED HEALTH CARE EDUCATION/TRAINING PROGRAM

## 2021-09-01 PROCEDURE — 76210000034 HC AMBSU PH I REC 0.5 TO 1 HR: Performed by: ORTHOPAEDIC SURGERY

## 2021-09-01 PROCEDURE — 74011000250 HC RX REV CODE- 250: Performed by: NURSE ANESTHETIST, CERTIFIED REGISTERED

## 2021-09-01 PROCEDURE — 77030040361 HC SLV COMPR DVT MDII -B

## 2021-09-01 PROCEDURE — A4565 SLINGS: HCPCS

## 2021-09-01 PROCEDURE — 77030006689 HC BLD OPHTH BVR BD -A: Performed by: ORTHOPAEDIC SURGERY

## 2021-09-01 PROCEDURE — 77030003601 HC NDL NRV BLK BBMI -A

## 2021-09-01 PROCEDURE — 76210000046 HC AMBSU PH II REC FIRST 0.5 HR: Performed by: ORTHOPAEDIC SURGERY

## 2021-09-01 PROCEDURE — 77030018836 HC SOL IRR NACL ICUM -A: Performed by: ORTHOPAEDIC SURGERY

## 2021-09-01 PROCEDURE — 77030006773 HC BLD SAW OSC BRSM -A: Performed by: ORTHOPAEDIC SURGERY

## 2021-09-01 PROCEDURE — 74011250636 HC RX REV CODE- 250/636: Performed by: NURSE ANESTHETIST, CERTIFIED REGISTERED

## 2021-09-01 PROCEDURE — 77030000032 HC CUF TRNQT ZIMM -B: Performed by: ORTHOPAEDIC SURGERY

## 2021-09-01 PROCEDURE — C1713 ANCHOR/SCREW BN/BN,TIS/BN: HCPCS | Performed by: ORTHOPAEDIC SURGERY

## 2021-09-01 PROCEDURE — 77030040922 HC BLNKT HYPOTHRM STRY -A

## 2021-09-01 PROCEDURE — 74011250636 HC RX REV CODE- 250/636: Performed by: ORTHOPAEDIC SURGERY

## 2021-09-01 DEVICE — K WIRE FIX L6IN DIA1.1MM ST S STL 3 SIDE DBL TRCR BOTH END: Type: IMPLANTABLE DEVICE | Status: FUNCTIONAL

## 2021-09-01 RX ORDER — SODIUM CHLORIDE, SODIUM LACTATE, POTASSIUM CHLORIDE, CALCIUM CHLORIDE 600; 310; 30; 20 MG/100ML; MG/100ML; MG/100ML; MG/100ML
125 INJECTION, SOLUTION INTRAVENOUS CONTINUOUS
Status: DISCONTINUED | OUTPATIENT
Start: 2021-09-01 | End: 2021-09-01 | Stop reason: HOSPADM

## 2021-09-01 RX ORDER — PROPOFOL 10 MG/ML
INJECTION, EMULSION INTRAVENOUS AS NEEDED
Status: DISCONTINUED | OUTPATIENT
Start: 2021-09-01 | End: 2021-09-01 | Stop reason: HOSPADM

## 2021-09-01 RX ORDER — SODIUM CHLORIDE, SODIUM LACTATE, POTASSIUM CHLORIDE, CALCIUM CHLORIDE 600; 310; 30; 20 MG/100ML; MG/100ML; MG/100ML; MG/100ML
INJECTION, SOLUTION INTRAVENOUS
Status: DISCONTINUED | OUTPATIENT
Start: 2021-09-01 | End: 2021-09-01 | Stop reason: HOSPADM

## 2021-09-01 RX ORDER — BACITRACIN ZINC 500 UNIT/G
OINTMENT (GRAM) TOPICAL AS NEEDED
Status: DISCONTINUED | OUTPATIENT
Start: 2021-09-01 | End: 2021-09-01 | Stop reason: HOSPADM

## 2021-09-01 RX ORDER — PROPOFOL 10 MG/ML
INJECTION, EMULSION INTRAVENOUS
Status: DISCONTINUED | OUTPATIENT
Start: 2021-09-01 | End: 2021-09-01 | Stop reason: HOSPADM

## 2021-09-01 RX ORDER — FENTANYL CITRATE 50 UG/ML
INJECTION, SOLUTION INTRAMUSCULAR; INTRAVENOUS AS NEEDED
Status: DISCONTINUED | OUTPATIENT
Start: 2021-09-01 | End: 2021-09-01 | Stop reason: HOSPADM

## 2021-09-01 RX ORDER — HYDROMORPHONE HYDROCHLORIDE 1 MG/ML
.25-1 INJECTION, SOLUTION INTRAMUSCULAR; INTRAVENOUS; SUBCUTANEOUS
Status: DISCONTINUED | OUTPATIENT
Start: 2021-09-01 | End: 2021-09-01 | Stop reason: HOSPADM

## 2021-09-01 RX ORDER — DIPHENHYDRAMINE HYDROCHLORIDE 50 MG/ML
12.5 INJECTION, SOLUTION INTRAMUSCULAR; INTRAVENOUS AS NEEDED
Status: DISCONTINUED | OUTPATIENT
Start: 2021-09-01 | End: 2021-09-01 | Stop reason: HOSPADM

## 2021-09-01 RX ORDER — ROPIVACAINE HYDROCHLORIDE 5 MG/ML
INJECTION, SOLUTION EPIDURAL; INFILTRATION; PERINEURAL AS NEEDED
Status: DISCONTINUED | OUTPATIENT
Start: 2021-09-01 | End: 2021-09-01 | Stop reason: HOSPADM

## 2021-09-01 RX ORDER — LIDOCAINE HYDROCHLORIDE 10 MG/ML
0.1 INJECTION, SOLUTION EPIDURAL; INFILTRATION; INTRACAUDAL; PERINEURAL AS NEEDED
Status: DISCONTINUED | OUTPATIENT
Start: 2021-09-01 | End: 2021-09-01 | Stop reason: HOSPADM

## 2021-09-01 RX ORDER — NALOXONE HYDROCHLORIDE 0.4 MG/ML
0.2 INJECTION, SOLUTION INTRAMUSCULAR; INTRAVENOUS; SUBCUTANEOUS
Status: DISCONTINUED | OUTPATIENT
Start: 2021-09-01 | End: 2021-09-01 | Stop reason: HOSPADM

## 2021-09-01 RX ORDER — FLUMAZENIL 0.1 MG/ML
0.2 INJECTION INTRAVENOUS
Status: DISCONTINUED | OUTPATIENT
Start: 2021-09-01 | End: 2021-09-01 | Stop reason: HOSPADM

## 2021-09-01 RX ORDER — LIDOCAINE HYDROCHLORIDE 20 MG/ML
INJECTION, SOLUTION INFILTRATION; PERINEURAL AS NEEDED
Status: DISCONTINUED | OUTPATIENT
Start: 2021-09-01 | End: 2021-09-01 | Stop reason: HOSPADM

## 2021-09-01 RX ORDER — MIDAZOLAM HYDROCHLORIDE 1 MG/ML
INJECTION, SOLUTION INTRAMUSCULAR; INTRAVENOUS AS NEEDED
Status: DISCONTINUED | OUTPATIENT
Start: 2021-09-01 | End: 2021-09-01 | Stop reason: HOSPADM

## 2021-09-01 RX ADMIN — MIDAZOLAM 1 MG: 1 INJECTION, SOLUTION INTRAMUSCULAR; INTRAVENOUS at 08:09

## 2021-09-01 RX ADMIN — CEFAZOLIN SODIUM 2 G: 1 POWDER, FOR SOLUTION INTRAMUSCULAR; INTRAVENOUS at 08:34

## 2021-09-01 RX ADMIN — ROPIVACAINE HYDROCHLORIDE 20 ML: 5 INJECTION, SOLUTION EPIDURAL; INFILTRATION; PERINEURAL at 08:17

## 2021-09-01 RX ADMIN — LIDOCAINE HYDROCHLORIDE 40 MG: 20 INJECTION, SOLUTION INFILTRATION; PERINEURAL at 08:40

## 2021-09-01 RX ADMIN — PROPOFOL 50 MG: 10 INJECTION, EMULSION INTRAVENOUS at 08:41

## 2021-09-01 RX ADMIN — PROPOFOL 80 MCG/KG/MIN: 10 INJECTION, EMULSION INTRAVENOUS at 08:41

## 2021-09-01 RX ADMIN — FENTANYL CITRATE 50 MCG: 50 INJECTION, SOLUTION INTRAMUSCULAR; INTRAVENOUS at 08:09

## 2021-09-01 RX ADMIN — SODIUM CHLORIDE, POTASSIUM CHLORIDE, SODIUM LACTATE AND CALCIUM CHLORIDE: 600; 310; 30; 20 INJECTION, SOLUTION INTRAVENOUS at 08:00

## 2021-09-01 NOTE — ANESTHESIA POSTPROCEDURE EVALUATION
Procedure(s):  RIGHT THUMB CARPOMETACARPAL ARTHROPLASTY (REGIONAL BLOCK W/MAC). MAC    Anesthesia Post Evaluation      Multimodal analgesia: multimodal analgesia used between 6 hours prior to anesthesia start to PACU discharge  Patient location during evaluation: PACU  Patient participation: complete - patient participated  Level of consciousness: awake and alert  Pain management: adequate  Airway patency: patent  Anesthetic complications: no  Cardiovascular status: acceptable  Respiratory status: acceptable  Hydration status: acceptable  Post anesthesia nausea and vomiting:  none  Final Post Anesthesia Temperature Assessment:  Normothermia (36.0-37.5 degrees C)      INITIAL Post-op Vital signs:   Vitals Value Taken Time   /81 09/01/21 1040   Temp 36.6 °C (97.9 °F) 09/01/21 0959   Pulse 56 09/01/21 1046   Resp 17 09/01/21 1046   SpO2 98 % 09/01/21 1046   Vitals shown include unvalidated device data.

## 2021-09-01 NOTE — ANESTHESIA PROCEDURE NOTES
Peripheral Block    Start time: 9/1/2021 8:09 AM  End time: 9/1/2021 8:17 AM  Performed by: Priscilla Hernandez DO  Authorized by: Priscilla Hernandez DO       Pre-procedure:    Indications: at surgeon's request and primary anesthetic    Preanesthetic Checklist: patient identified, risks and benefits discussed, site marked, timeout performed, anesthesia consent given and patient being monitored    Timeout Time: 08:09 EDT          Block Type:   Block Type:  Supraclavicular  Laterality:  Right  Monitoring:  Continuous pulse ox, frequent vital sign checks, heart rate, responsive to questions and oxygen  Injection Technique:  Single shot  Procedures: ultrasound guided    Patient Position: supine  Prep: chlorhexidine    Location:  Supraclavicular  Needle Type:  Stimuplex  Needle Gauge:  21 G  Needle Localization:  Anatomical landmarks and ultrasound guidance    Assessment:  Number of attempts:  1  Injection Assessment:  Incremental injection every 5 mL, local visualized surrounding nerve on ultrasound, negative aspiration for blood, no paresthesia and no intravascular symptoms  Patient tolerance:  Patient tolerated the procedure well with no immediate complications

## 2021-09-01 NOTE — PERIOP NOTES
Discharge instructions/education reviewed with pt/pt's daughter with understanding verbalized. Pt escorted off the unit with a w/c and in NAD, home with daughter who is driving. Pt's daughter signed hard copy discharge form.

## 2021-09-01 NOTE — DISCHARGE INSTRUCTIONS
SEE ADDITIONAL DISCHARGE INSTRUCTIONS FROM DR. Lane Andrade from your Nurse      PATIENT INSTRUCTIONS    After general anesthesia or intravenous sedation, for 24 hours or while taking prescription Narcotics:  · Limit your activities  · Do not drive and operate hazardous machinery  · Do not make important personal or business decisions  · Do  not drink alcoholic beverages  · If you have not urinated within 8 hours after discharge, please contact your surgeon on call. Report the following to your surgeon:  · Excessive pain, swelling, redness or odor of or around the surgical area  · Temperature over 100.5  · Nausea and vomiting lasting longer than 4 hours or if unable to take medications  · Any signs of decreased circulation or nerve impairment to extremity: change in color, persistent  numbness, tingling, coldness or increase pain  · Any questions      GOOD HELP TO FIGHT AN INFECTION  Here are a few tip to help reduce the chance of getting an infection after surgery:   Wash Your Hands   Good handwashing is the most important thing you and your caregiver can do.  Wash before and after caring for any wounds. Dry your hand with a clean towel.  Wash with soap and water for at least 20 seconds. A TIP: sing the \"Happy Birthday\" song through one time while washing to help with the timing.  Use a hand  in between washings.  Shower   When your surgeon says it is OK to take a shower, use a new bar of antibacterial soap (if that is what you use, and keep that bar of soap ONLY for your use), or antibacterial body wash.  Use a clean wash cloth or sponge when you bathe.  Dry off with a clean towel  after every bath - be careful around any wounds, skin staples, sutures or surgical glue over/on wounds.  Do not enter swimming pools, hot tubs, lakes, rivers and/or ocean until wounds are healed and your doctor/surgeon says it is OK.      Use Clean Sheets   Sleep on freshly laundered sheets after your surgery.  Keep the surgery site covered with a clean, dry bandage (if instructed to do so). If the bandage becomes soiled, reapply a new, dry, clean bandage.  Do not allow pets to sleep with you while your wound is healing.  Lifestyle Modification and Controlling Your Blood Sugar   Smoking slows wound healing. Stop smoking and limit exposure to second-hand smoke.  High blood sugar slows wound healing. Eat a well-balanced diet to provide proper nutrition while healing   Monitor your blood sugar (if you are a diabetic) and take your medications as you are suppose to so you can control you blood sugar after surgery. COUGH AND DEEP BREATHE    Breathing deeply and coughing are very important exercises to do after surgery. Deep breathing and coughing open the little air tubes and air sacks in your lungs. You take deep breaths every day. You may not even notice - it is just something you do when you sigh or yawn. It is a natural exercise you do to keep these air passages open. After surgery, take deep breaths and cough, on purpose. DIRECTIONS:  · Take 10 to 15 slow deep breaths every hour while awake. · Breathe in deeply, and hold it for 2 seconds. · Exhale slowly through puckered lips, like blowing up a balloon. · After every 4th or 5th deep breath, hug your pillow to your chest or belly and give a hard, deep cough. Yes, it will probably hurt. But doing this exercise is a very important part of healing after surgery. Take your pain medicine to help you do this exercise without too much pain. Coughing and deep breathing help prevent bronchitis and pneumonia after surgery. If you had chest or belly surgery, use a pillow as a \"hug marisa\" and hold it tightly to your chest or belly when you cough.        ANKLE PUMPS    Ankle pumps increase the circulation of oxygenated blood to your lower extremities and decrease your risk for circulation problems such as blood clots. They also stretch the muscles, tendons and ligaments in your foot and ankle, and prevent joint contracture in the ankle and foot, especially after surgeries on the legs. It is important to do ankle pump exercises regularly after surgery because immobility increases your risk for developing a blood clot. Your doctor may also have you take an Aspirin for the next few days as well. If your doctor did not ask you to take an Aspirin, consult with him before starting Aspirin therapy on your own. The exercise is quite simple. · Slowly point your foot forward, feeling the muscles on the top of your lower leg stretch, and hold this position for 5 seconds. · Next, pull your foot back toward you as far as possible, stretching the calf muscles, and hold that position for 5 seconds. · Repeat with the other foot. · Perform 10 repetitions every hour while awake for both ankles if possible (down and then up with the foot once is one repetition). You should feel gentle stretching of the muscles in your lower leg when doing this exercise. If you feel pain, or your range of motion is limited, don't push too hard. Only go the limit your joint and muscles will let you go. If you have increasing pain, progressively worsening leg warmth or swelling, STOP the exercise and call your doctor. MEDICATION AND   SIDE EFFECT GUIDE    The [facility] MEDICATION AND SIDE EFFECT GUIDE was provided to the PATIENT AND CARE PROVIDER. Information provided includes instruction about drug purpose and common side effects for the following medications:   · Keila Jiménez  · Bhavana Pryor  · PERCOCET      These are general instructions for a healthy lifestyle:    *   Please give a list of your current medications to your Primary Care Provider.   *   Please update this list whenever your medications are discontinued, doses are changed, or new medications (including over-the-counter products) are added.  *   Please carry medication information at all times in case of emergency situations. About Smoking  No smoking / No tobacco products  Avoid exposure to second hand smoke     Surgeon General's Warning:  Quitting smoking now greatly reduces serious risk to your health. Obesity, smoking, and sedentary lifestyle greatly increases your risk for illness and disease. A healthy diet, regular physical exercise & weight monitoring are important for maintaining a healthy lifestyle. Congestive Heart Failure  You may be retaining fluid if you have a history of heart failure or if you experience any of the following symptoms:  Weight gain of 3 pounds or more overnight or 5 pounds in a week, increased swelling in your hands or feet or shortness of breath while lying flat in bed. Please call your doctor as soon as you notice any of these symptoms; do not wait until your next office visit. Recognize signs and symptoms of STROKE:  F -  Face looks uneven  A -  Arms unable to move or move evenly  S -  Speech slurred or non-existent  T -  Time-call 911 as soon as signs and symptoms begin-DO NOT go          back to bed or wait to see if you get better-TIME IS BRAIN. Warning Signs of HEART ATTACK   Call 911 if you have these symptoms:     Chest discomfort. Most heart attacks involve discomfort in the center of the chest that lasts more than a few minutes, or that goes away and comes back. It can feel like uncomfortable pressure, squeezing, fullness, or pain.  Discomfort in other areas of the upper body. Symptoms can include pain or discomfort in one or both arms, the back, neck, jaw, or stomach.  Shortness of breath with or without chest discomfort.  Other signs may include breaking out in a cold sweat, nausea, or lightheadedness. Don't wait more than five minutes to call 911 - MINUTES MATTER! Fast action can save your life.  Calling 911 is almost always the fastest way to get lifesaving treatment. Emergency Medical Services staff can begin treatment when they arrive -- up to an hour sooner than if someone gets to the hospital by car. Learning About Coronavirus [phone])  Coronavirus [phone]): Overview  What is coronavirus (COVID-19)? The coronavirus disease (COVID-19) is caused by a virus. It is an illness that was first found in [place], [place], in December 2019. It has since spread worldwide. The virus can cause fever, cough, and trouble breathing. In severe cases, it can cause pneumonia and make it hard to breathe without help. It can cause death. Coronaviruses are a large group of viruses. They cause the common cold. They also cause more serious illnesses like [place] East respiratory syndrome (MERS) and severe acute respiratory syndrome (SARS). COVID-19 is caused by a novel coronavirus. That means it's a new type that has not been seen in people before. This virus spreads person-to-person through droplets from coughing and sneezing. It can also spread when you are close to someone who is infected. And it can spread when you touch something that has the virus on it, such as a doorknob or a tabletop. What can you do to protect yourself from coronavirus (COVID-19)? The best way to protect yourself from getting sick is to:  · Avoid areas where there is an outbreak. · Avoid contact with people who may be infected. · Wash your hands often with soap or alcohol-based hand sanitizers. · Avoid crowds and try to stay at least 6 feet away from other people. · Wash your hands often, especially after you cough or sneeze. Use soap and water, and scrub for at least 20 seconds. If soap and water aren't available, use an alcohol-based hand . · Avoid touching your mouth, nose, and eyes. What can you do to avoid spreading the virus to others? To help avoid spreading the virus to others:  · Cover your mouth with a tissue when you cough or sneeze.  Then throw the tissue in the trash.  · Use a disinfectant to clean things that you touch often. · Stay home if you are sick or have been exposed to the virus. Don't go to school, work, or public areas. And don't use public transportation. · If you are sick:  ? Leave your home only if you need to get medical care. But call the doctor's office first so they know you're coming. And wear a face mask, if you have one.  ? If you have a face mask, wear it whenever you're around other people. It can help stop the spread of the virus when you cough or sneeze. ? Clean and disinfect your home every day. Use household  and disinfectant wipes or sprays. Take special care to clean things that you grab with your hands. These include doorknobs, remote controls, phones, and handles on your refrigerator and microwave. And don't forget countertops, tabletops, bathrooms, and computer keyboards. When to call for help  Call 911 anytime you think you may need emergency care. For example, call if:  · You have severe trouble breathing. (You can't talk at all.)  · You have constant chest pain or pressure. · You are severely dizzy or lightheaded. · You are confused or can't think clearly. · Your face and lips have a blue color. · You pass out (lose consciousness) or are very hard to wake up. Call your doctor now if you develop symptoms such as:  · Shortness of breath. · Fever. · Cough. If you need to get care, call ahead to the doctor's office for instructions before you go. Make sure you wear a face mask, if you have one, to prevent exposing other people to the virus. Where can you get the latest information? The following health organizations are tracking and studying this virus. Their websites contain the most up-to-date information. Suleiman Mendoza also learn what to do if you think you may have been exposed to the virus. · U.[place]. Centers for Disease Control and Prevention (CDC): The CDC provides updated news about the disease and travel advice.  The website also tells you how to prevent the spread of infection. www.cdc.gov  · World Health Organization [facility]): WHO offers information about the virus outbreaks. WHO also has travel advice. www.who.int  Current as of: April 1, 2020               Content Version: 12.4  © 3665-9289 [employer]. Care instructions adapted under license by your healthcare professional. If you have questions about a medical condition or this instruction, always ask your healthcare professional. [address] any warranty or liability for your use of this information. The discharge information has been reviewed with the patient and DAUGHTER. Any questions and concerns from the patient and DAUGHTER have been addressed. The patient and DAUGHTER verbalized understanding. CONTENTS FOUND IN YOUR DISCHARGE ENVELOPE:  [x]     Surgeon and Hospital Discharge Instructions  [x]     [facility] Care Provider Card  [x]     Medication & Side Effect Guide            (your newly prescribed medications have been marked/highlighted showing the most common side effects from   the classes of drugs on your prescriptions)  [x]     Medication Prescription(s) x 3 (JENNIFER Reyes) ( [x] These have been sent electronically to your pharmacy [facility]: [address], [address] [address]) by your surgeon,   - OR -       your surgeon has already provided these to you during a previous/pre-op office visit)  []     [address]  []     Physical Therapy Prescription  [x]     Follow-up Appointment Cards (FOLLOW-UP APPT: 09/14/21 [facility]. [facility] AT 1:30PM)  []     Surgery-related Pictures/Media  [x]     Pain block and/or block with On-Q Catheter from Anesthesia Service (information included in your instructions above)  []     Medical device information sheets/pamphlets from their    []     School/work excuse note. []     /parent work excuse note.       The following personal items collected during your admission are returned to you:   Dental Appliance: Dental Appliances: Lowers, Uppers  Vision: Visual Aid: None  Hearing Aid:    Jewelry: Jewelry: None  Clothing: Clothing: Undergarments, Footwear, Pants, Shirt  Other Valuables: Other Valuables: Cell Phone, Home Medical Equipment(comment) (portable oxygen tank)  Valuables sent to safe: Personal Items Sent to Safe: n/a          Going Home After A  Peripheral Nerve Block    Patient Information    The anesthesiology team has provided for your pain control through a technique called regional anesthesia. As the name implies, anesthesia (decreased or no pain, sensation, or motor control) has been provided to a specific region, whether that be an arm or a leg. How does this happen?  you might ask. While you were sleepy, the anesthesia provider provided medicine to a specific group of nerves either in the neck/shoulder region or in the groin and/or buttock region, similar to the way a dentist might numb a tooth (teeth.)  They typically use an ultrasound machine to know where the medicine is placed in relation to the nerves they wish to numb up or block.   What this means is that for the next few hours, you should expect to have a numb limb. Below are some things we wish for you to read and be familiar with concerning your anesthetized limb. Caring For a Blocked Body Part    General Considerations:   The numbness may last up to 24 hours   You must protect your arm or leg. The blocked extremity is numb, weak, and difficult for your brain to locate and communicate with. To do this you should:  o Pay attention to the position of the blocked limb at all times. o Be very careful when placing hot or cod items on a numb limb. You could cause tissue damage like burns or frostbite if you are unable to feel temperature.   Carefully follow your discharge instructions regarding the use of these therapies in you post-operative care. o Carefully pad the affected limb. Normally we continually move and adjust the position of our bodies without thinking about it. This movement and continuous repositioning helps to prevent injuries from immobility. When a limb is numb it still requires this care  o Be extremely careful not to bump or hit the numb body part. This can result in an unrecognized injury, at lease until the blocked limb wakes up. It can also result in worse pain of your already post-surgical limb. Arm/Shoulder Blocks:   You may experience a droopy eyelid, nasal stuffiness, and redness of the eye after receiving an arm/shoulder block. This is called Mikaelas Syndrome, and is very common. There is no need for concern. You may also experience mild hoarseness, but all of these symptoms should resolve within 24 hours.  Some patients may experience mild shortness of breath. A sitting position will help alleviate this and it should resolve within 24 hours. If you experience significant or progressive worsening of the shortness of breath, seek medical attention immediately. Knee/Foot Blocks:   DO NOT use the blocked leg to walk, balance or support yourself. Your leg will not be able to balance your weight properly while any part of your leg is numb. You are at a RISK for [address].  Be careful not to drag your foot along the ground or stub your toes. Contact Phone Numbers    CALL 911 IN CASE OF AN EMERGENCY. For all other non-emergency inquiries call the [facility]  at [phone] and ask for the anesthesiologist on call to be paged. Cold Therapy Instructions    Your nurse will provide a cold therapy wrap based upon your surgery, need, and your doctor's orders. INSTRUCTIONS FOR USE:  All cooling wraps produce sustained periods of intense cold. NEVER PLACE PAD ON BARE SKIN OR HAVE CONTACT WITH BARE SKIN  Always Use An Insulating Barrier.     Tissue injury can occur if these devices are used improperly. Follow your surgeons instructions carefully regarding the frequency, duration and breaks from cold therapy, and the total length of treatment. Check under the pad barrier every 2 hours for skin injury (see below.)      SIGNS OF SKIN INJURY:  STOP USE AND CONTACT YOUR SURGEON if any of the following occur: Increased pain, burning, increased swelling, itching, blisters, increased redness, discoloration, welts, or other change in skin condition. INDICATIONS:  Back, Hip, Knee or Shoulder Surgery and Post-Operative Treatment; Trauma; Orthopedic Rehabilitation      CONTRAINDICATIONS:  Cold therapy should not be used by persons with Diabetes, Raynaud's or other vasospastic disease, cold hypersensititvity, or compromised local circulation. Certain medical conditions make cold-induced injury more likely. Please consult with your healthcare provider before use.

## 2021-09-01 NOTE — BRIEF OP NOTE
Brief Postoperative Note    Patient: Ashlee Collazo  YOB: 1950  MRN: [id number]    Date of Procedure: 9/1/2021     Pre-Op Diagnosis: Arthritis of carpometacarpal Fajardo) joint of right thumb [M18.11]    Post-Op Diagnosis: Same as preoperative diagnosis.       Procedure(s):  RIGHT THUMB CARPOMETACARPAL ARTHROPLASTY (REGIONAL BLOCK W/MAC)    Surgeon(s):  Basilia Clifford MD    Surgical Assistant: None    Anesthesia: Regional     Estimated Blood Loss (mL): Minimal    Complications: None    Specimens: * No specimens in log *     Implants: * No implants in log *    Drains: * No LDAs found *    Findings: Right Thumb Osteoarthritis    Electronically Signed by Lottie Pope NP on 9/1/2021 at 7:14 AM

## 2022-05-05 NOTE — PATIENT INSTRUCTIONS

## 2022-05-05 NOTE — PROGRESS NOTES
CARDIOLOGY OFFICE NOTE    Nicholas Foster MD, [address], [place], [address] [facility]  Phone [phone]; Fax [phone]  Mobile [phone]   Voice Mail [phone]      LAST OFFICE VISIT : Visit date not found        ICD-10-CM ICD-9-CM   1. Hypertension, unspecified type  I10 401.9            Liudmila Barrera is a 67 y.o. female with  HTN, and dyslipidemia referred for follow up. Cardiac risk factors: family history, dyslipidemia, diabetes mellitus, hypertension, post-menopausal  I have personally obtained the history from the patient. HISTORY OF PRESENTING ILLNESS     She states she is doing well from a cardiac standpoint. She has no chest pain but has her baseline shortness of breath. She is on continuous oxygen secondary her COPD. Her last stress test was in 2015 and her last echo was in 2020. She is stable from a cardiac standpoint so we talked about the fact that she does not need a stress test at this point since she has no symptoms and does not need a repeat echo since she had one just 2 years ago. She has significant COPD. She will walk to the trash can and take the trash out and does this with no chest pain or worsening shortness of breath. Her activity is limited.        ACTIVE PROBLEM LIST     Patient Active Problem List    Diagnosis Date Noted    Abnormal mammogram with microcalcification 03/23/2015           PAST MEDICAL HISTORY     Past Medical History:   Diagnosis Date    Chronic obstructive pulmonary disease ([address]     COVID-19 07/2020    COVID-19 vaccine series completed 2021    Pfizer    Emphysema lung ([address]     Hand arthritis     Hypertension     Oxygen dependent     2 L via NC    PVC (premature ventricular contraction)     Followed by Dr. Milda Lombard Renal insufficiency 08/23/2021           PAST SURGICAL HISTORY     Past Surgical History:   Procedure Laterality Date    COLONOSCOPY N/A 6/2/2016    COLONOSCOPY performed by Nickolas Nelson Gonzales MD at [facility] HX ARTHROPLASTY Left     Thumb    HX BREAST BIOPSY Left 2015    LEFT BREAST BIOPSY WITH ULTRASOUND performed by Jessica Pat MD at [address] [place] [address] HX CARPAL TUNNEL RELEASE Right     HX ORTHOPAEDIC Right     trigger finger release    HX OTHER SURGICAL  2000    Polyps removed from vocal cords    HX TONSILLECTOMY      HX TUBAL LIGATION            ALLERGIES     Allergies   Allergen Reactions    Metformin Other (comments)     Kidney report occur poor.  Sulfa (Sulfonamide Antibiotics) Itching          FAMILY HISTORY     No family history on file. negative for cardiac disease       SOCIAL HISTORY     Social History     Socioeconomic History    Marital status:    Tobacco Use    Smoking status: Former Smoker     Packs/day: 2.00     Years: 40.00     Pack years: 80.00     Types: Cigarettes     Quit date: 2012     Years since quittin.4    Smokeless tobacco: Never Used    Tobacco comment: 80 pack year history. Substance and Sexual Activity    Alcohol use: No    Drug use: No    Sexual activity: Never         MEDICATIONS     Current Outpatient Medications   Medication Sig    lisinopriL (PRINIVIL, ZESTRIL) 30 mg tablet Take 30 mg by mouth every morning.  fluticasone-umeclidinium-vilanterol (Trelegy Ellipta) 100-62.5-25 mcg inhaler Take 1 Puff by inhalation daily.  acetaminophen (TYLENOL) 500 mg tablet Take  by mouth every six (6) hours as needed for Pain.  Oxygen 2 Each.  atorvastatin (LIPITOR) 10 mg tablet Take 10 mg by mouth nightly.  albuterol (PROAIR HFA) 90 mcg/actuation inhaler Take  by inhalation.  citalopram (CELEXA) 20 mg tablet Take 20 mg by mouth every evening. No current facility-administered medications for this visit. I have reviewed the nurses notes, vitals, problem list, allergy list, medical history, family, social history and medications.        REVIEW OF SYMPTOMS   Positives per HPI  General: Pt denies excessive weight gain or loss. Pt is able to conduct ADL's  HEENT: Denies blurred vision, headaches, hearing loss, epistaxis and difficulty swallowing. Respiratory: Denies cough, congestion, JIMENES, wheezing or stridor. Positive for SOB  Cardiovascular: Denies precordial pain, palpitations, edema or PND  Gastrointestinal: Denies poor appetite, indigestion, abdominal pain or blood in stool  Genitourinary: Denies hematuria, dysuria, increased urinary frequency  Musculoskeletal: Denies joint pain or swelling from muscles or joints  Neurologic: Denies tremor, paresthesias, headache, or sensory motor disturbance  Psychiatric: Denies confusion, insomnia, depression  Integumentray: Denies rash, itching or ulcers. Hematologic: Denies easy bruising, bleeding     PHYSICAL EXAMINATION      Vitals:    05/06/22 0922   BP: 130/70   Pulse: 78   SpO2: 99%   Weight: 133 lb (60.3 kg)   Height: 5' 2\" (1.575 m)     General: Well developed, in no acute distress. HEENT: No jaundice, oral mucosa moist, no oral ulcers  Neck: Supple, no stiffness, no lymphadenopathy, supple  Heart:   Regular  rate and rhythm  Respiratory: Reduced breath sounds throughout particularly at the right base  Extremities:  No edema, normal cap refill, no cyanosis. Musculoskeletal: No clubbing, no deformities  Neuro: A&Ox3, speech clear, gait stable, cooperative, no focal neurologic deficits  Skin: Skin color is normal. No rashes or lesions. Non diaphoretic, moist.          EKG: NSR with nonspecific STT changes     DIAGNOSTIC DATA     1. Lipids   5/20/16- , HDL 59, LDL 86,    8/14/17- , HDL 62, LDL 76, Tg 140   2/28/20- , HDL 64, LDL 70,    8/28/20- , HDL 61, LDL 77,    2/9/21- , HDL 58, LDL 87,    8/13/21- , HDL 66, LDL 88, TG 85  3/3/22- , HDL 71, LDL 87, TG 52    2. Echo   9/20/13- EF 55%   3/4/20-EF 50 - 55%, Aortic valve sclerosis with no evidence of reduced excursion.  Aortic valve mean gradient is 4.7 mmHg. Aortic valve area is 1.5 cm2. Mild aortic valve stenosis   Mild PI     3. Lexiscan   3/2/15- no ischemia         LABORATORY DATA            Lab Results   Component Value Date/Time    WBC 8.0 04/01/2015 09:01 AM    HGB 12.4 04/01/2015 09:01 AM    HCT 37.7 04/01/2015 09:01 AM    PLATELET 582 48/54/9330 09:01 AM    MCV 87.1 04/01/2015 09:01 AM      Lab Results   Component Value Date/Time    Sodium 139 08/23/2021 11:47 AM    Potassium 4.8 08/23/2021 11:47 AM    Chloride 107 08/23/2021 11:47 AM    CO2 31 08/23/2021 11:47 AM    Anion gap 1 (L) 08/23/2021 11:47 AM    Glucose 79 08/23/2021 11:47 AM    BUN 39 (H) 08/23/2021 11:47 AM    Creatinine 1.46 (H) 08/23/2021 11:47 AM    BUN/Creatinine ratio 27 (H) 08/23/2021 11:47 AM    GFR est AA 43 (L) 08/23/2021 11:47 AM    GFR est non-AA 35 (L) 08/23/2021 11:47 AM    Calcium 9.3 08/23/2021 11:47 AM    Bilirubin, total 0.4 02/28/2020 10:10 AM    Alk. phosphatase 60 02/28/2020 10:10 AM    Protein, total 6.4 02/28/2020 10:10 AM    Albumin 4.3 02/28/2020 10:10 AM    ALT (SGPT) 18 02/28/2020 10:10 AM           ASSESSMENT/RECOMMENDATIONS:.      1. Dyspnea/Emphysema   -Was being followed by pulmonary and is on oxygen at home  2. Dyslipidemia  - Followed by Neil Longoria MD  -Last cholesterol in March 2022 LDL was 87 at goal  3. HTN  -Blood pressure is good today at 130/70. She currently is taking lisinopril 30 mg a day  4. Follow-up with me in 6 months      No orders of the defined types were placed in this encounter. Follow-up and Dispositions  ·   Return in about 6 months (around 11/6/2022). I have discussed the diagnosis with  Andrés Barraza and the intended plan as seen in the above orders. Questions were answered concerning future plans. I have discussed medication side effects and warnings with the patient as well. Thank you,  Neil Longoria MD for involving me in the care of  [facility].  Please do not hesitate to contact me for further questions/concerns. Nicholas Madrid MD, [facility]    Patient Care Team:  Nemesio Mejia MD as PCP - General (Family Medicine)    [address], [address] [place] [address]. Ben Gottlieb.      [phone] / [phone] Fax

## 2022-05-06 ENCOUNTER — OFFICE VISIT (OUTPATIENT)
Dept: CARDIOLOGY CLINIC | Age: 72
End: 2022-05-06
Payer: MEDICARE

## 2022-05-06 VITALS
HEART RATE: 78 BPM | DIASTOLIC BLOOD PRESSURE: 70 MMHG | BODY MASS INDEX: 24.48 KG/M2 | HEIGHT: 62 IN | SYSTOLIC BLOOD PRESSURE: 130 MMHG | WEIGHT: 133 LBS | OXYGEN SATURATION: 99 %

## 2022-05-06 DIAGNOSIS — I10 HYPERTENSION, UNSPECIFIED TYPE: Primary | ICD-10-CM

## 2022-05-06 PROCEDURE — G8427 DOCREV CUR MEDS BY ELIG CLIN: HCPCS | Performed by: SPECIALIST

## 2022-05-06 PROCEDURE — G8420 CALC BMI NORM PARAMETERS: HCPCS | Performed by: SPECIALIST

## 2022-05-06 PROCEDURE — G8754 DIAS BP LESS 90: HCPCS | Performed by: SPECIALIST

## 2022-05-06 PROCEDURE — G8399 PT W/DXA RESULTS DOCUMENT: HCPCS | Performed by: SPECIALIST

## 2022-05-06 PROCEDURE — G9899 SCRN MAM PERF RSLTS DOC: HCPCS | Performed by: SPECIALIST

## 2022-05-06 PROCEDURE — G8432 DEP SCR NOT DOC, RNG: HCPCS | Performed by: SPECIALIST

## 2022-05-06 PROCEDURE — 1101F PT FALLS ASSESS-DOCD LE1/YR: CPT | Performed by: SPECIALIST

## 2022-05-06 PROCEDURE — 3017F COLORECTAL CA SCREEN DOC REV: CPT | Performed by: SPECIALIST

## 2022-05-06 PROCEDURE — G8536 NO DOC ELDER MAL SCRN: HCPCS | Performed by: SPECIALIST

## 2022-05-06 PROCEDURE — G8752 SYS BP LESS 140: HCPCS | Performed by: SPECIALIST

## 2022-05-06 PROCEDURE — G0463 HOSPITAL OUTPT CLINIC VISIT: HCPCS | Performed by: SPECIALIST

## 2022-05-06 PROCEDURE — 1090F PRES/ABSN URINE INCON ASSESS: CPT | Performed by: SPECIALIST

## 2022-05-06 PROCEDURE — 99214 OFFICE O/P EST MOD 30 MIN: CPT | Performed by: SPECIALIST

## 2022-05-06 NOTE — LETTER
5/6/2022    Patient: Sky Mayorga   YOB: 1950   Date of Visit: 5/6/2022     Nahomy Colvin, [address] [facility] [address]  Via Fax: [phone]    Dear Nahomy Colvin MD,      Thank you for referring Ms. Sky Mayorga to [facility] for evaluation. My notes for this consultation are attached. If you have questions, please do not hesitate to call me. I look forward to following your patient along with you.       Sincerely,    Dean Mattson MD

## 2022-05-06 NOTE — PROGRESS NOTES
Chief Complaint   Patient presents with    Hypertension    Follow-up     6 mo      Visit Vitals  /70 (BP 1 Location: Left upper arm, BP Patient Position: Sitting)   Pulse 78   Ht 5' 2\" (1.575 m)   Wt 133 lb (60.3 kg)   SpO2 99%   BMI 24.33 kg/m²     Chest pain denied   SOB Yes in O2. 2L.   Palpitations denied   Swelling in hands/feet denied   Dizziness denied   Recent hospital stays denied   Refills denied

## 2022-06-16 ENCOUNTER — TRANSCRIBE ORDER (OUTPATIENT)
Dept: SCHEDULING | Age: 72
End: 2022-06-16

## 2022-06-16 DIAGNOSIS — Z12.31 SCREENING MAMMOGRAM FOR HIGH-RISK PATIENT: Primary | ICD-10-CM

## 2022-06-17 ENCOUNTER — TRANSCRIBE ORDER (OUTPATIENT)
Dept: SCHEDULING | Age: 72
End: 2022-06-17

## 2022-06-22 ENCOUNTER — HOSPITAL ENCOUNTER (OUTPATIENT)
Dept: MAMMOGRAPHY | Age: 72
Discharge: HOME | End: 2022-06-22
Payer: MEDICARE

## 2022-06-22 DIAGNOSIS — Z12.31 SCREENING MAMMOGRAM FOR HIGH-RISK PATIENT: ICD-10-CM

## 2022-06-22 PROCEDURE — 77063 BREAST TOMOSYNTHESIS BI: CPT

## 2022-09-06 ENCOUNTER — TRANSCRIBE ORDER (OUTPATIENT)
Dept: SCHEDULING | Age: 72
End: 2022-09-06

## 2022-09-06 DIAGNOSIS — Z87.891 FORMER SMOKER: Primary | ICD-10-CM

## 2022-09-07 ENCOUNTER — TRANSCRIBE ORDER (OUTPATIENT)
Dept: SCHEDULING | Age: 72
End: 2022-09-07

## 2022-09-07 DIAGNOSIS — Z87.891 FORMER SMOKER: Primary | ICD-10-CM

## 2022-09-19 ENCOUNTER — HOSPITAL ENCOUNTER (OUTPATIENT)
Dept: CT IMAGING | Age: 72
Discharge: HOME | End: 2022-09-19
Payer: MEDICARE

## 2022-09-19 VITALS — WEIGHT: 135 LBS | BODY MASS INDEX: 24.84 KG/M2 | HEIGHT: 62 IN

## 2022-09-19 DIAGNOSIS — Z87.891 FORMER SMOKER: ICD-10-CM

## 2022-09-19 PROCEDURE — 71271 CT THORAX LUNG CANCER SCR C-: CPT

## 2022-11-10 NOTE — PROGRESS NOTES
CARDIOLOGY OFFICE NOTE    Nicholas Nieves MD, [address], [place], [address] [facility]  Phone [phone]; Fax [phone]  Mobile [phone]   Voice Mail [phone]            ICD-10-CM ICD-9-CM   1. Hypertension, unspecified type  I10 401.9            Esther Acuna is a 67 y.o. female with  HTN, and dyslipidemia referred for follow up. Cardiac risk factors: family history, dyslipidemia, diabetes mellitus, hypertension, post-menopausal  I have personally obtained the history from the patient. HISTORY OF PRESENTING ILLNESS       This March she is he is doing well. She denies any chest pain or worsening shortness of breath. She does have emphysema. She is on 2 L of oxygen. She denies any other cardiac symptoms. We talked about the potential for  Atrial fibrillation with her underlying lung disease and what symptoms to look for.          ACTIVE PROBLEM LIST     Patient Active Problem List    Diagnosis Date Noted    Abnormal mammogram with microcalcification 03/23/2015           PAST MEDICAL HISTORY     Past Medical History:   Diagnosis Date    Chronic obstructive pulmonary disease ([address]     COVID-19 07/2020    COVID-19 vaccine series completed 2021    Pfizer    Emphysema lung (HCC)     Hand arthritis     Hypertension     Oxygen dependent     2 L via NC    PVC (premature ventricular contraction)     Followed by Dr. Edwin Nieves    Renal insufficiency 08/23/2021           PAST SURGICAL HISTORY     Past Surgical History:   Procedure Laterality Date    COLONOSCOPY N/A 6/2/2016    COLONOSCOPY performed by Byron Lloyd MD at [facility] END[facility]    HX ARTHROPLASTY Left     Thumb    HX BREAST BIOPSY Left 4/7/2015    LEFT BREAST BIOPSY WITH ULTRASOUND performed by El Paul MD at [address] [facility] Right     HX ORTHOPAEDIC Right     trigger finger release    HX OTHER SURGICAL  2000    Polyps removed from vocal cords    [address] HX TUBAL LIGATION            ALLERGIES     Allergies   Allergen Reactions    Metformin Other (comments)     Kidney report occur poor. Sulfa (Sulfonamide Antibiotics) Itching          FAMILY HISTORY     No family history on file. negative for cardiac disease       SOCIAL HISTORY     Social History     Socioeconomic History    Marital status:    Tobacco Use    Smoking status: Former     Packs/day: 2.00     Years: 40.00     Pack years: 80.00     Types: Cigarettes     Quit date: 2012     Years since quittin.9    Smokeless tobacco: Never    Tobacco comments:     80 pack year history. Substance and Sexual Activity    Alcohol use: No    Drug use: No    Sexual activity: Never         MEDICATIONS     Current Outpatient Medications   Medication Sig    lisinopriL (PRINIVIL, ZESTRIL) 30 mg tablet Take 30 mg by mouth every morning. fluticasone-umeclidinium-vilanterol (Trelegy Ellipta) 100-62.5-25 mcg inhaler Take 1 Puff by inhalation daily. acetaminophen (TYLENOL) 500 mg tablet Take  by mouth every six (6) hours as needed for Pain. Oxygen 2 Each. atorvastatin (LIPITOR) 10 mg tablet Take 10 mg by mouth nightly. albuterol (PROVENTIL HFA, VENTOLIN HFA, PROAIR HFA) 90 mcg/actuation inhaler Take  by inhalation. citalopram (CELEXA) 20 mg tablet Take 20 mg by mouth every evening. No current facility-administered medications for this visit. I have reviewed the nurses notes, vitals, problem list, allergy list, medical history, family, social history and medications. REVIEW OF SYMPTOMS   Positives per HPI  General: Pt denies excessive weight gain or loss. Pt is able to conduct ADL's  HEENT: Denies blurred vision, headaches, hearing loss, epistaxis and difficulty swallowing. Respiratory: Denies cough, congestion, JIMENES, wheezing or stridor.  Positive for SOB  Cardiovascular: Denies precordial pain, palpitations, edema or PND  Gastrointestinal: Denies poor appetite, indigestion, abdominal pain or blood in stool  Genitourinary: Denies hematuria, dysuria, increased urinary frequency  Musculoskeletal: Denies joint pain or swelling from muscles or joints  Neurologic: Denies tremor, paresthesias, headache, or sensory motor disturbance  Psychiatric: Denies confusion, insomnia, depression  Integumentray: Denies rash, itching or ulcers. Hematologic: Denies easy bruising, bleeding     PHYSICAL EXAMINATION      Vitals:    11/11/22 1043   BP: 130/74   Pulse: 71   SpO2: 98%   Weight: 136 lb (61.7 kg)   Height: 5' 2\" (1.575 m)       General: Well developed, in no acute distress. HEENT: No jaundice, oral mucosa moist, no oral ulcers  Neck: Supple, no stiffness, no lymphadenopathy, supple  Heart:   Regular  rate and rhythm  Respiratory: Reduced breath sounds throughout particularly at the right base  Extremities:  No edema, normal cap refill, no cyanosis. Musculoskeletal: No clubbing, no deformities  Neuro: A&Ox3, speech clear, gait stable, cooperative, no focal neurologic deficits  Skin: Skin color is normal. No rashes or lesions. Non diaphoretic, moist.          EKG: NSR with nonspecific STT changes     DIAGNOSTIC DATA     1. Lipids    8/28/20- , HDL 61, LDL 77,    2/9/21- , HDL 58, LDL 87,    8/13/21- , HDL 66, LDL 88, TG 85  3/3/22- , HDL 71, LDL 87, TG 52  9/10/22- , HDL 69, LDL 90, TG 98    2. Echo   9/20/13- EF 55%   3/4/20-EF 50 - 55%, Aortic valve sclerosis with no evidence of reduced excursion. Aortic valve mean gradient is 4.7 mmHg. Aortic valve area is 1.5 cm2. Mild aortic valve stenosis   Mild PI     3.  Lexiscan   3/2/15- no ischemia         LABORATORY DATA            Lab Results   Component Value Date/Time    WBC 8.0 04/01/2015 09:01 AM    HGB 12.4 04/01/2015 09:01 AM    HCT 37.7 04/01/2015 09:01 AM    PLATELET 775 05/43/8915 09:01 AM    MCV 87.1 04/01/2015 09:01 AM      Lab Results   Component Value Date/Time    Sodium 139 08/23/2021 11:47 AM Potassium 4.8 08/23/2021 11:47 AM    Chloride 107 08/23/2021 11:47 AM    CO2 31 08/23/2021 11:47 AM    Anion gap 1 (L) 08/23/2021 11:47 AM    Glucose 79 08/23/2021 11:47 AM    BUN 39 (H) 08/23/2021 11:47 AM    Creatinine 1.46 (H) 08/23/2021 11:47 AM    BUN/Creatinine ratio 27 (H) 08/23/2021 11:47 AM    GFR est AA 43 (L) 08/23/2021 11:47 AM    GFR est non-AA 35 (L) 08/23/2021 11:47 AM    Calcium 9.3 08/23/2021 11:47 AM    Bilirubin, total 0.4 02/28/2020 10:10 AM    Alk. phosphatase 60 02/28/2020 10:10 AM    Protein, total 6.4 02/28/2020 10:10 AM    Albumin 4.3 02/28/2020 10:10 AM    ALT (SGPT) 18 02/28/2020 10:10 AM           ASSESSMENT/RECOMMENDATIONS:.      1. Dyspnea/Emphysema   -Remains on 2 L of oxygen  2. Dyslipidemia  - Followed by Neela Maloney MD  -LDL was 90 checked in September 2022  -Continue diet low in red meat  3. HTN  -Blood pressure is good today at 130/74. She currently is taking lisinopril 30 mg a day  -Low-sodium diet  4. Follow-up with me in 6 months      Orders Placed This Encounter    AMB POC EKG ROUTINE W/ 12 LEADS, INTER & REP     Order Specific Question:   Reason for Exam:     Answer:   HTN            Follow-up and Dispositions    Return in about 6 months (around 5/11/2023). I have discussed the diagnosis with  Josiane Pump and the intended plan as seen in the above orders. Questions were answered concerning future plans. I have discussed medication side effects and warnings with the patient as well. Thank you,  Neela Maloney MD for involving me in the care of  Josiane Pump. Please do not hesitate to contact me for further questions/concerns. Nicholas Adan MD, [address] [facility]    Patient Care Team:  Neela Maloney MD as PCP - General (Family Medicine)    [address], [address] [place], [place] [address]      [phone] / [phone] Fax

## 2022-11-10 NOTE — PATIENT INSTRUCTIONS

## 2022-11-11 ENCOUNTER — OFFICE VISIT (OUTPATIENT)
Dept: CARDIOLOGY CLINIC | Age: 72
End: 2022-11-11
Payer: MEDICARE

## 2022-11-11 VITALS
OXYGEN SATURATION: 98 % | HEIGHT: 62 IN | SYSTOLIC BLOOD PRESSURE: 130 MMHG | HEART RATE: 71 BPM | BODY MASS INDEX: 25.03 KG/M2 | DIASTOLIC BLOOD PRESSURE: 74 MMHG | WEIGHT: 136 LBS

## 2022-11-11 DIAGNOSIS — I10 HYPERTENSION, UNSPECIFIED TYPE: Primary | ICD-10-CM

## 2022-11-11 PROCEDURE — 3074F SYST BP LT 130 MM HG: CPT | Performed by: SPECIALIST

## 2022-11-11 PROCEDURE — G9899 SCRN MAM PERF RSLTS DOC: HCPCS | Performed by: SPECIALIST

## 2022-11-11 PROCEDURE — G8754 DIAS BP LESS 90: HCPCS | Performed by: SPECIALIST

## 2022-11-11 PROCEDURE — G8420 CALC BMI NORM PARAMETERS: HCPCS | Performed by: SPECIALIST

## 2022-11-11 PROCEDURE — 93005 ELECTROCARDIOGRAM TRACING: CPT | Performed by: SPECIALIST

## 2022-11-11 PROCEDURE — G8432 DEP SCR NOT DOC, RNG: HCPCS | Performed by: SPECIALIST

## 2022-11-11 PROCEDURE — G8399 PT W/DXA RESULTS DOCUMENT: HCPCS | Performed by: SPECIALIST

## 2022-11-11 PROCEDURE — 1090F PRES/ABSN URINE INCON ASSESS: CPT | Performed by: SPECIALIST

## 2022-11-11 PROCEDURE — 3017F COLORECTAL CA SCREEN DOC REV: CPT | Performed by: SPECIALIST

## 2022-11-11 PROCEDURE — 3078F DIAST BP <80 MM HG: CPT | Performed by: SPECIALIST

## 2022-11-11 PROCEDURE — 99214 OFFICE O/P EST MOD 30 MIN: CPT | Performed by: SPECIALIST

## 2022-11-11 PROCEDURE — G8752 SYS BP LESS 140: HCPCS | Performed by: SPECIALIST

## 2022-11-11 PROCEDURE — G8536 NO DOC ELDER MAL SCRN: HCPCS | Performed by: SPECIALIST

## 2022-11-11 PROCEDURE — G0463 HOSPITAL OUTPT CLINIC VISIT: HCPCS | Performed by: SPECIALIST

## 2022-11-11 PROCEDURE — 93010 ELECTROCARDIOGRAM REPORT: CPT | Performed by: SPECIALIST

## 2022-11-11 PROCEDURE — 1101F PT FALLS ASSESS-DOCD LE1/YR: CPT | Performed by: SPECIALIST

## 2022-11-11 PROCEDURE — G8427 DOCREV CUR MEDS BY ELIG CLIN: HCPCS | Performed by: SPECIALIST

## 2022-11-11 PROCEDURE — 1123F ACP DISCUSS/DSCN MKR DOCD: CPT | Performed by: SPECIALIST

## 2022-11-11 NOTE — PROGRESS NOTES
Raudel Nance is a 67 y.o. female    Visit Vitals  /74 (BP 1 Location: Left upper arm, BP Patient Position: Sitting, BP Cuff Size: Adult)   Pulse 71   Ht 5' 2\" (1.575 m)   Wt 136 lb (61.7 kg)   SpO2 98%   BMI 24.87 kg/m²       Chief Complaint   Patient presents with    Cholesterol Problem    Hypertension       Chest pain NO  SOB YES  Dizziness NO  Swelling NO  Recent hospital visit NO  Refills NO  COVID VACCINE STATUS YES  HAD COVID?  YES

## 2022-11-11 NOTE — LETTER
11/11/2022    Patient: Heavenly Kaufman   YOB: 1950   Date of Visit: 11/11/2022     Ibis Peres MD  [address] [facility] [address]  Via Fax: [phone]    Dear Ibis Peres MD,      Thank you for referring Ms. Christi Luo to [facility] for evaluation. My notes for this consultation are attached. If you have questions, please do not hesitate to call me. I look forward to following your patient along with you.       Sincerely,    Pam Zuñiga MD

## 2023-01-04 ENCOUNTER — TRANSCRIBE ORDER (OUTPATIENT)
Dept: SCHEDULING | Age: 73
End: 2023-01-04

## 2023-01-04 DIAGNOSIS — Z87.891 PERSONAL HISTORY OF NICOTINE DEPENDENCE: Primary | ICD-10-CM

## 2023-01-11 ENCOUNTER — APPOINTMENT (OUTPATIENT)
Dept: GENERAL RADIOLOGY | Age: 73
End: 2023-01-11
Attending: EMERGENCY MEDICINE
Payer: MEDICARE

## 2023-01-11 ENCOUNTER — HOSPITAL ENCOUNTER (EMERGENCY)
Age: 73
Discharge: HOME | End: 2023-01-11
Attending: EMERGENCY MEDICINE
Payer: MEDICARE

## 2023-01-11 VITALS
OXYGEN SATURATION: 98 % | DIASTOLIC BLOOD PRESSURE: 84 MMHG | HEART RATE: 82 BPM | TEMPERATURE: 98.4 F | SYSTOLIC BLOOD PRESSURE: 164 MMHG | RESPIRATION RATE: 21 BRPM

## 2023-01-11 DIAGNOSIS — J44.1 COPD EXACERBATION (HCC): Primary | ICD-10-CM

## 2023-01-11 LAB
ALBUMIN SERPL-MCNC: 3.4 G/DL (ref 3.5–5)
ALBUMIN/GLOB SERPL: 1.1 (ref 1.1–2.2)
ALP SERPL-CCNC: 82 U/L (ref 45–117)
ALT SERPL-CCNC: 68 U/L (ref 12–78)
ANION GAP SERPL CALC-SCNC: 6 MMOL/L (ref 5–15)
AST SERPL W P-5'-P-CCNC: 25 U/L (ref 15–37)
BASOPHILS # BLD: 0 K/UL (ref 0–0.1)
BASOPHILS NFR BLD: 0 % (ref 0–1)
BILIRUB SERPL-MCNC: 0.3 MG/DL (ref 0.2–1)
BUN SERPL-MCNC: 34 MG/DL (ref 6–20)
BUN/CREAT SERPL: 22 (ref 12–20)
CA-I BLD-MCNC: 8.8 MG/DL (ref 8.5–10.1)
CHLORIDE SERPL-SCNC: 107 MMOL/L (ref 97–108)
CO2 SERPL-SCNC: 28 MMOL/L (ref 21–32)
COVID-19 RAPID TEST, COVR: NOT DETECTED
CREAT SERPL-MCNC: 1.55 MG/DL (ref 0.55–1.02)
DIFFERENTIAL METHOD BLD: ABNORMAL
EOSINOPHIL # BLD: 0.2 K/UL (ref 0–0.4)
EOSINOPHIL NFR BLD: 1 % (ref 0–7)
ERYTHROCYTE [DISTWIDTH] IN BLOOD BY AUTOMATED COUNT: 12.9 % (ref 11.5–14.5)
GLOBULIN SER CALC-MCNC: 3.2 G/DL (ref 2–4)
GLUCOSE SERPL-MCNC: 98 MG/DL (ref 65–100)
HCT VFR BLD AUTO: 39.3 % (ref 35–47)
HGB BLD-MCNC: 12.6 G/DL (ref 11.5–16)
IMM GRANULOCYTES # BLD AUTO: 0.1 K/UL (ref 0–0.04)
IMM GRANULOCYTES NFR BLD AUTO: 1 % (ref 0–0.5)
LYMPHOCYTES # BLD: 2.6 K/UL (ref 0.8–3.5)
LYMPHOCYTES NFR BLD: 25 % (ref 12–49)
MCH RBC QN AUTO: 29.5 PG (ref 26–34)
MCHC RBC AUTO-ENTMCNC: 32.1 G/DL (ref 30–36.5)
MCV RBC AUTO: 92 FL (ref 80–99)
MONOCYTES # BLD: 0.9 K/UL (ref 0–1)
MONOCYTES NFR BLD: 8 % (ref 5–13)
NEUTS SEG # BLD: 6.8 K/UL (ref 1.8–8)
NEUTS SEG NFR BLD: 65 % (ref 32–75)
NRBC # BLD: 0 K/UL (ref 0–0.01)
NRBC BLD-RTO: 0 PER 100 WBC
PLATELET # BLD AUTO: 231 K/UL (ref 150–400)
PMV BLD AUTO: 10.4 FL (ref 8.9–12.9)
POTASSIUM SERPL-SCNC: 4.2 MMOL/L (ref 3.5–5.1)
PROT SERPL-MCNC: 6.6 G/DL (ref 6.4–8.2)
RBC # BLD AUTO: 4.27 M/UL (ref 3.8–5.2)
SODIUM SERPL-SCNC: 141 MMOL/L (ref 136–145)
TROPONIN-HIGH SENSITIVITY: 17 NG/L (ref 0–51)
WBC # BLD AUTO: 10.5 K/UL (ref 3.6–11)

## 2023-01-11 PROCEDURE — 99285 EMERGENCY DEPT VISIT HI MDM: CPT

## 2023-01-11 PROCEDURE — 84484 ASSAY OF TROPONIN QUANT: CPT

## 2023-01-11 PROCEDURE — 74011000250 HC RX REV CODE- 250: Performed by: EMERGENCY MEDICINE

## 2023-01-11 PROCEDURE — 80053 COMPREHEN METABOLIC PANEL: CPT

## 2023-01-11 PROCEDURE — 94762 N-INVAS EAR/PLS OXIMTRY CONT: CPT

## 2023-01-11 PROCEDURE — 74011250636 HC RX REV CODE- 250/636: Performed by: EMERGENCY MEDICINE

## 2023-01-11 PROCEDURE — 36415 COLL VENOUS BLD VENIPUNCTURE: CPT

## 2023-01-11 PROCEDURE — 93005 ELECTROCARDIOGRAM TRACING: CPT

## 2023-01-11 PROCEDURE — 71045 X-RAY EXAM CHEST 1 VIEW: CPT

## 2023-01-11 PROCEDURE — 94640 AIRWAY INHALATION TREATMENT: CPT

## 2023-01-11 PROCEDURE — 96374 THER/PROPH/DIAG INJ IV PUSH: CPT

## 2023-01-11 PROCEDURE — 87635 SARS-COV-2 COVID-19 AMP PRB: CPT

## 2023-01-11 PROCEDURE — 85025 COMPLETE CBC W/AUTO DIFF WBC: CPT

## 2023-01-11 RX ORDER — IPRATROPIUM BROMIDE AND ALBUTEROL SULFATE 2.5; .5 MG/3ML; MG/3ML
3 SOLUTION RESPIRATORY (INHALATION)
Status: COMPLETED | OUTPATIENT
Start: 2023-01-11 | End: 2023-01-11

## 2023-01-11 RX ORDER — PREDNISONE 20 MG/1
40 TABLET ORAL DAILY
Qty: 8 TABLET | Refills: 0 | Status: SHIPPED | OUTPATIENT
Start: 2023-01-11 | End: 2023-01-15

## 2023-01-11 RX ORDER — IPRATROPIUM BROMIDE AND ALBUTEROL SULFATE 2.5; .5 MG/3ML; MG/3ML
3 SOLUTION RESPIRATORY (INHALATION)
Qty: 84 ML | Refills: 0 | Status: SHIPPED | OUTPATIENT
Start: 2023-01-11 | End: 2023-01-18

## 2023-01-11 RX ADMIN — IPRATROPIUM BROMIDE AND ALBUTEROL SULFATE 3 ML: 2.5; .5 SOLUTION RESPIRATORY (INHALATION) at 20:53

## 2023-01-11 RX ADMIN — METHYLPREDNISOLONE SODIUM SUCCINATE 125 MG: 125 INJECTION, POWDER, FOR SOLUTION INTRAMUSCULAR; INTRAVENOUS at 20:12

## 2023-01-11 NOTE — ED TRIAGE NOTES
Pt arrives in respiratory distress, oxygen 90% on home O2 2l via NC. HX COPD and emphysema.  Denies CP

## 2023-01-12 LAB
ATRIAL RATE: 79 BPM
CALCULATED P AXIS, ECG09: 75 DEGREES
CALCULATED R AXIS, ECG10: 72 DEGREES
CALCULATED T AXIS, ECG11: 72 DEGREES
DIAGNOSIS, 93000: NORMAL
P-R INTERVAL, ECG05: 124 MS
Q-T INTERVAL, ECG07: 392 MS
QRS DURATION, ECG06: 80 MS
QTC CALCULATION (BEZET), ECG08: 449 MS
VENTRICULAR RATE, ECG03: 79 BPM

## 2023-01-12 NOTE — ED PROVIDER NOTES
EMERGENCY DEPARTMENT HISTORY AND PHYSICAL EXAM      Date: 1/11/2023  Patient Name: Dolly Mao    History of Presenting Illness     Chief Complaint   Patient presents with    COPD    Respiratory Distress     History Provided By: Patient    HPI: Dolly Mao, 75 y.o. female with a history of COPD and hypertension who is currently at home on 2 L nasal cannula at all times who presents with cough and difficulty breathing for 1 week. She saw her primary care doctor who placed her on steroids and antibiotics. She has been using her breathing treatments. She states that symptoms are not improving. States that she has shortness of breath with any exertion. There are no other complaints, changes, or physical findings at this time. PCP: Emir Potter MD    Current Outpatient Medications   Medication Sig Dispense Refill    predniSONE (DELTASONE) 20 mg tablet Take 2 Tablets by mouth daily for 4 days. With Breakfast 8 Tablet 0    albuterol-ipratropium (DUO-NEB) 2.5 mg-0.5 mg/3 ml nebu 3 mL by Nebulization route every six (6) hours as needed for Wheezing or Shortness of Breath for up to 7 days. 84 mL 0    lisinopriL (PRINIVIL, ZESTRIL) 30 mg tablet Take 30 mg by mouth every morning.  fluticasone-umeclidinium-vilanterol (Trelegy Ellipta) 100-62.5-25 mcg inhaler Take 1 Puff by inhalation daily.  acetaminophen (TYLENOL) 500 mg tablet Take  by mouth every six (6) hours as needed for Pain.  Oxygen 2 Each.  atorvastatin (LIPITOR) 10 mg tablet Take 10 mg by mouth nightly.  albuterol (PROVENTIL HFA, VENTOLIN HFA, PROAIR HFA) 90 mcg/actuation inhaler Take  by inhalation.  citalopram (CELEXA) 20 mg tablet Take 20 mg by mouth every evening.          Past History   Past Medical History:  Past Medical History:   Diagnosis Date    [facility] ([facility]     COVID-19 07/2020    COVID-19 vaccine series completed 2021    Pfizer    Emphysema lung ([facility]     Hand arthritis     Hypertension     Oxygen dependent     2 L via NC    PVC (premature ventricular contraction)     Followed by Dr. Viktor Villalobos Renal insufficiency 08/23/2021        Past Surgical History:  Past Surgical History:   Procedure Laterality Date    COLONOSCOPY N/A 6/2/2016    COLONOSCOPY performed by Holland Ivan MD at [address] [facility] HX ARTHROPLASTY Left     Thumb    HX BREAST BIOPSY Left 4/7/2015    LEFT BREAST BIOPSY WITH ULTRASOUND performed by Candy Price MD at [address] HX CARPAL TUNNEL RELEASE Right     HX ORTHOPAEDIC Right     trigger finger release    HX OTHER SURGICAL  2000    Polyps removed from vocal cords    HX TONSILLECTOMY  1955    HX TUBAL LIGATION         Family History:  History reviewed. No pertinent family history. Social History:  Social History     Tobacco Use    Smoking status: Former     Packs/day: 2.00     Years: 40.00     Pack years: 80.00     Types: Cigarettes     Quit date: 12/13/2012     Years since quitting: 10.0    Smokeless tobacco: Never    Tobacco comments:     80 pack year history. Substance Use Topics    Alcohol use: No    Drug use: No       Allergies: Allergies   Allergen Reactions    Metformin Other (comments)     Kidney report occur poor.  Sulfa (Sulfonamide Antibiotics) Itching        Review of Systems   Review of Systems   Constitutional:  Negative for fever. HENT:  Negative for congestion. Eyes:  Negative for visual disturbance. Respiratory:  Positive for cough and shortness of breath. Cardiovascular:  Negative for chest pain. Gastrointestinal:  Negative for abdominal pain. Genitourinary:  Negative for dysuria. Musculoskeletal:  Negative for arthralgias. Skin:  Negative for rash. Neurological:  Negative for headaches. Physical Exam   Constitutional: Chronically ill-appearing. Well-nourished. Skin: No rash. ENT: No rhinorrhea. Productive cough. Head is normocephalic and atraumatic.   Eye: No proptosis or conjunctival injections. Respiratory: Lung sounds are significantly wheezy and coarse bilaterally. Gastrointestinal: Nondistended. Musculoskeletal: No obvious bony deformities. Psychiatric: Cooperative. Appropriate mood and affect. Cardiac: Regular rate and rhythm. 2+ radial pulses. No murmurs. Lab and Diagnostic Study Results   Labs -     Recent Results (from the past 12 hour(s))   CBC WITH AUTOMATED DIFF    Collection Time: 01/11/23  7:16 PM   Result Value Ref Range    WBC 10.5 3.6 - 11.0 K/uL    RBC 4.27 3.80 - 5.20 M/uL    HGB 12.6 11.5 - 16.0 g/dL    HCT 39.3 35.0 - 47.0 %    MCV 92.0 80.0 - 99.0 FL    MCH 29.5 26.0 - 34.0 PG    MCHC 32.1 30.0 - 36.5 g/dL    RDW 12.9 11.5 - 14.5 %    PLATELET 840 398 - 437 K/uL    MPV 10.4 8.9 - 12.9 FL    NRBC 0.0 0.0  WBC    ABSOLUTE NRBC 0.00 0.00 - 0.01 K/uL    NEUTROPHILS 65 32 - 75 %    LYMPHOCYTES 25 12 - 49 %    MONOCYTES 8 5 - 13 %    EOSINOPHILS 1 0 - 7 %    BASOPHILS 0 0 - 1 %    IMMATURE GRANULOCYTES 1 (H) 0 - 0.5 %    ABS. NEUTROPHILS 6.8 1.8 - 8.0 K/UL    ABS. LYMPHOCYTES 2.6 0.8 - 3.5 K/UL    ABS. MONOCYTES 0.9 0.0 - 1.0 K/UL    ABS. EOSINOPHILS 0.2 0.0 - 0.4 K/UL    ABS. BASOPHILS 0.0 0.0 - 0.1 K/UL    ABS. IMM. GRANS. 0.1 (H) 0.00 - 0.04 K/UL    DF AUTOMATED     METABOLIC PANEL, COMPREHENSIVE    Collection Time: 01/11/23  7:16 PM   Result Value Ref Range    Sodium 141 136 - 145 mmol/L    Potassium 4.2 3.5 - 5.1 mmol/L    Chloride 107 97 - 108 mmol/L    CO2 28 21 - 32 mmol/L    Anion gap 6 5 - 15 mmol/L    Glucose 98 65 - 100 mg/dL    BUN 34 (H) 6 - 20 mg/dL    Creatinine 1.55 (H) 0.55 - 1.02 mg/dL    BUN/Creatinine ratio 22 (H) 12 - 20      eGFR 35 (L) >60 ml/min/1.73m2    Calcium 8.8 8.5 - 10.1 mg/dL    Bilirubin, total 0.3 0.2 - 1.0 mg/dL    AST (SGOT) 25 15 - 37 U/L    ALT (SGPT) 68 12 - 78 U/L    Alk.  phosphatase 82 45 - 117 U/L    Protein, total 6.6 6.4 - 8.2 g/dL    Albumin 3.4 (L) 3.5 - 5.0 g/dL    Globulin 3.2 2.0 - 4.0 g/dL    A-G Ratio 1.1 1.1 - 2.2     TROPONIN-HIGH SENSITIVITY    Collection Time: 01/11/23  7:16 PM   Result Value Ref Range    Troponin-High Sensitivity 17 0 - 51 ng/L   COVID-19 RAPID TEST    Collection Time: 01/11/23  8:18 PM   Result Value Ref Range    COVID-19 rapid test Not Detected Not Detected         Radiologic Studies -   [unfilled]  CT Results  (Last 48 hours)      None          CXR Results  (Last 48 hours)                 01/11/23 1924  XR CHEST PORT Final result    Impression:      No acute process on portable chest.           Narrative:  EXAM:  XR CHEST PORT       INDICATION: Shortness of breath       COMPARISON: CT chest 9/19/2022, chest radiographs 8/13/2021       TECHNIQUE: Upright portable chest AP view       FINDINGS: Right humeral head surgical anchor screw. The cardiac silhouette is   within normal limits. The pulmonary vasculature is within normal limits. The lungs and pleural spaces are clear. The visualized bones and upper abdomen   are age-appropriate. Medical Decision Making and ED Course   - I am the first and primary provider for this patient AND AM THE PRIMARY PROVIDER OF RECORD. I reviewed the vital signs, available nursing notes, past medical history, past surgical history, family history and social history. - Initial assessment performed. The patients presenting problems have been discussed, and the staff are in agreement with the care plan formulated and outlined with them. I have encouraged them to ask questions as they arise throughout their visit. Vital Signs-Reviewed the patient's vital signs.   Patient Vitals for the past 12 hrs:   Temp Pulse Resp BP SpO2   01/11/23 2200 -- 79 20 (!) 141/84 98 %   01/11/23 2106 -- 78 15 (!) 163/95 98 %   01/11/23 2054 -- -- -- -- 98 %   01/11/23 2036 -- 74 20 (!) 164/90 98 %   01/11/23 2006 -- 76 16 (!) 136/91 99 %   01/11/23 1944 -- 76 17 (!) 135/98 98 %   01/11/23 1929 -- 78 14 (!) 146/95 98 %   01/11/23 1906 -- 83 26 -- 98 %   01/11/23 1859 98.4 °F (36.9 °C) (!) 103 (!) 39 (!) 167/101 90 %     MDM  Differential diagnosis: Pneumonia, COPD exacerbation, pulmonary edema, CHF, viral URI    Presents with cough and difficulty breathing. I have concern for likely COPD exacerbation. We will test the patient for COVID. I have ordered basic labs including CBC, CMP, troponin, and chest x-ray. I have also given the patient a DuoNeb 3 mL treatment x2 and 125 mg IV Solu-Medrol. Patient states she is feeling significantly better on reevaluation. I listen to her lung sounds and they are clear. She stated that she was on azithromycin and steroids and just finished them yesterday. I considered admission however patient does not want to stay. Her lungs are sounding a lot better. I think it will be safe to let her go home. I prescribed 40 mg p.o. daily for 4 more days. I also prescribed her more DuoNeb treatments. I recommend returning if she has any difficulty breathing. Disposition     Disposition: Discharged    DISCHARGE PLAN:  1. Current Discharge Medication List        CONTINUE these medications which have NOT CHANGED    Details   lisinopriL (PRINIVIL, ZESTRIL) 30 mg tablet Take 30 mg by mouth every morning. fluticasone-umeclidinium-vilanterol (Trelegy Ellipta) 100-62.5-25 mcg inhaler Take 1 Puff by inhalation daily. acetaminophen (TYLENOL) 500 mg tablet Take  by mouth every six (6) hours as needed for Pain. Oxygen 2 Each. atorvastatin (LIPITOR) 10 mg tablet Take 10 mg by mouth nightly. albuterol (PROVENTIL HFA, VENTOLIN HFA, PROAIR HFA) 90 mcg/actuation inhaler Take  by inhalation. citalopram (CELEXA) 20 mg tablet Take 20 mg by mouth every evening.            2.   Follow-up Information       Follow up With Specialties Details Why Contact Info    Primary care doctor  Schedule an appointment as soon as possible for a visit in 3 days      [address] [facility] EMERGENCY DEPT Emergency Medicine Go today As soon as possible if symptoms worsen [address] [facility] [address]  [phone]          3. Return to ED if worse   4. Current Discharge Medication List        START taking these medications    Details   predniSONE (DELTASONE) 20 mg tablet Take 2 Tablets by mouth daily for 4 days. With Breakfast  Qty: 8 Tablet, Refills: 0  Start date: 1/11/2023, End date: 1/15/2023      albuterol-ipratropium (DUO-NEB) 2.5 mg-0.5 mg/3 ml nebu 3 mL by Nebulization route every six (6) hours as needed for Wheezing or Shortness of Breath for up to 7 days. Qty: 84 mL, Refills: 0  Start date: 1/11/2023, End date: 1/18/2023              Diagnosis/Clinical Impression     Clinical Impression:   1. COPD exacerbation ([facility] [address]           Attestations:  Jazmin Miles, DO    Please note that this dictation was completed with [employer], the computer voice recognition software. Quite often unanticipated grammatical, syntax, homophones, and other interpretive errors are inadvertently transcribed by the computer software. Please disregard these errors. Please excuse any errors that have escaped final proofreading. Thank you.

## 2023-01-26 ENCOUNTER — TRANSCRIBE ORDER (OUTPATIENT)
Dept: SCHEDULING | Age: 73
End: 2023-01-26

## 2023-01-26 DIAGNOSIS — Z71.9 HEALTH EDUCATION: Primary | ICD-10-CM

## 2023-02-10 ENCOUNTER — TRANSCRIBE ORDER (OUTPATIENT)
Dept: SCHEDULING | Age: 73
End: 2023-02-10

## 2023-02-10 DIAGNOSIS — J96.11 CHRONIC RESPIRATORY FAILURE WITH HYPOXIA (HCC): Primary | ICD-10-CM

## 2023-02-22 ENCOUNTER — HOSPITAL ENCOUNTER (OUTPATIENT)
Dept: CT IMAGING | Age: 73
Discharge: HOME | End: 2023-02-22
Payer: MEDICARE

## 2023-02-22 DIAGNOSIS — J96.11 CHRONIC RESPIRATORY FAILURE WITH HYPOXIA (HCC): ICD-10-CM

## 2023-02-22 PROCEDURE — 71250 CT THORAX DX C-: CPT

## 2023-04-19 LAB — HBA1C MFR BLD HPLC: 6.2 %

## 2023-04-21 ENCOUNTER — TRANSCRIBE ORDER (OUTPATIENT)
Dept: SCHEDULING | Age: 73
End: 2023-04-21

## 2023-04-21 DIAGNOSIS — R93.89 ABNORMAL CHEST CT: Primary | ICD-10-CM

## 2023-04-24 ENCOUNTER — TRANSCRIBE ORDERS (OUTPATIENT)
Facility: HOSPITAL | Age: 73
End: 2023-04-24

## 2023-04-24 DIAGNOSIS — R93.89 ABNORMAL CHEST CT: Primary | ICD-10-CM

## 2023-05-22 ENCOUNTER — OFFICE VISIT (OUTPATIENT)
Age: 73
End: 2023-05-22
Payer: MEDICARE

## 2023-05-22 VITALS
HEART RATE: 72 BPM | DIASTOLIC BLOOD PRESSURE: 82 MMHG | BODY MASS INDEX: 25.03 KG/M2 | OXYGEN SATURATION: 97 % | WEIGHT: 136 LBS | SYSTOLIC BLOOD PRESSURE: 122 MMHG | HEIGHT: 62 IN

## 2023-05-22 DIAGNOSIS — I10 ESSENTIAL (PRIMARY) HYPERTENSION: Primary | ICD-10-CM

## 2023-05-22 DIAGNOSIS — E78.5 DYSLIPIDEMIA: ICD-10-CM

## 2023-05-22 PROCEDURE — 3074F SYST BP LT 130 MM HG: CPT | Performed by: SPECIALIST

## 2023-05-22 PROCEDURE — 99214 OFFICE O/P EST MOD 30 MIN: CPT | Performed by: SPECIALIST

## 2023-05-22 PROCEDURE — 1123F ACP DISCUSS/DSCN MKR DOCD: CPT | Performed by: SPECIALIST

## 2023-05-22 PROCEDURE — 1036F TOBACCO NON-USER: CPT | Performed by: SPECIALIST

## 2023-05-22 PROCEDURE — 3017F COLORECTAL CA SCREEN DOC REV: CPT | Performed by: SPECIALIST

## 2023-05-22 PROCEDURE — G8427 DOCREV CUR MEDS BY ELIG CLIN: HCPCS | Performed by: SPECIALIST

## 2023-05-22 PROCEDURE — 1090F PRES/ABSN URINE INCON ASSESS: CPT | Performed by: SPECIALIST

## 2023-05-22 PROCEDURE — 3079F DIAST BP 80-89 MM HG: CPT | Performed by: SPECIALIST

## 2023-05-22 PROCEDURE — G8420 CALC BMI NORM PARAMETERS: HCPCS | Performed by: SPECIALIST

## 2023-05-22 PROCEDURE — G8399 PT W/DXA RESULTS DOCUMENT: HCPCS | Performed by: SPECIALIST

## 2023-05-22 RX ORDER — AZITHROMYCIN 250 MG/1
TABLET, FILM COATED ORAL
COMMUNITY
Start: 2023-05-16

## 2023-05-22 RX ORDER — AMOXICILLIN AND CLAVULANATE POTASSIUM 875; 125 MG/1; MG/1
1 TABLET, FILM COATED ORAL EVERY 12 HOURS
COMMUNITY
Start: 2023-03-01 | End: 2023-05-22 | Stop reason: CLARIF

## 2023-05-22 NOTE — PROGRESS NOTES
CARDIOLOGY OFFICE NOTE    Nahum Murray MD, [address], [place], [address] [facility]  Phone [phone]; Fax [phone]  Mobile [phone]   Voice Mail [phone]    Primary care: Vahe Galvez MD       ATTENTION:   This medical record was transcribed using an electronic medical records/speech recognition system. Although proofread, it may and can contain electronic, spelling and other errors. Corrections may be executed at a later time. Please feel free to contact us for any clarifications as needed. Glen Sawyer is a 75 y.o. female with  referred for hypertension and dyslipidemia          Cardiac risk factors: family history, dyslipidemia, diabetes mellitus, hypertension, post-menopausal   I have personally obtained the history from the patient. HISTORY OF PRESENTING ILLNESS    Ms./Mr. Glen Sawyer  75 y.o. is being seen for hypertension dyslipidemia and a history of COPD. She states she is doing well and has noticed that her shortness of breath is maybe slightly worse and most likely related to her emphysema. She was hospitalized for exacerbation of her COPD.      I once again reiterated to her the risk of her going in atrial fibrillation     ACTIVE PROBLEM LIST     Patient Active Problem List    Diagnosis Date Noted    Abnormal mammogram with microcalcification 03/23/2015           PAST MEDICAL HISTORY     Past Medical History:   Diagnosis Date    Chronic obstructive pulmonary disease ([address]     COVID-19 07/2020    COVID-19 vaccine series completed 2021    Pfizer    Emphysema lung (HCC)     Hand arthritis     Hypertension     Oxygen dependent     2 L via NC    PVC (premature ventricular contraction)     Followed by Dr. Van Murray    Renal insufficiency 08/23/2021           PAST SURGICAL HISTORY     Past Surgical History:   Procedure Laterality Date    ARTHROPLASTY Left     Thumb    BREAST BIOPSY Left 4/7/2015    LEFT BREAST

## 2023-05-22 NOTE — PROGRESS NOTES
NAME Doug Balderas         1950      MRN    [id number]      LAST OFFICE APPOINTMENT: 2022     DIAGNOSIS  No diagnosis found. HOME MEDICATION  Current Outpatient Medications   Medication Sig    azithromycin (ZITHROMAX) 250 MG tablet     OXYGEN 2 each    acetaminophen (TYLENOL) 500 MG tablet Take by mouth every 6 hours as needed    albuterol sulfate HFA (PROVENTIL;VENTOLIN;PROAIR) 108 (90 Base) MCG/ACT inhaler Inhale into the lungs    atorvastatin (LIPITOR) 10 MG tablet Take 1 tablet by mouth nightly    citalopram (CELEXA) 20 MG tablet Take 1 tablet by mouth every evening    fluticasone-umeclidin-vilant (TRELEGY ELLIPTA) 100-62.5-25 MCG/ACT AEPB inhaler Inhale 1 puff into the lungs daily    lisinopril (PRINIVIL;ZESTRIL) 30 MG tablet Take 1 tablet by mouth     No current facility-administered medications for this visit. VITAL SIGNS  Wt Readings from Last 3 Encounters:   23 136 lb (61.7 kg)   22 136 lb (61.7 kg)   22 133 lb (60.3 kg)     BP Readings from Last 3 Encounters:   23 122/82   22 130/74   22 130/70     Pulse Readings from Last 3 Encounters:   23 72   22 71   22 78         SPECIALTY COMMENTS  1. Lipids    20- , HDL 61, LDL 77,    21- , HDL 58, LDL 87,    21- , HDL 66, LDL 88, TG 85  3/3/22- , HDL 71, LDL 87, TG 52  9/10/22- , HDL 69, LDL 90, TG 98  23- , HDL 71, LDL 91,     2. Echo   13- EF 55%   3/4/20-EF 50 - 55%, Aortic valve sclerosis with no evidence of reduced excursion. Aortic valve mean gradient is 4.7 mmHg. Aortic valve area is 1.5 cm2. Mild aortic valve stenosis   Mild PI     3.  Lexiscan   3/2/15- no ischemia

## 2023-05-22 NOTE — PATIENT INSTRUCTIONS

## 2023-05-24 ENCOUNTER — HOSPITAL ENCOUNTER (OUTPATIENT)
Facility: HOSPITAL | Age: 73
Discharge: HOME | End: 2023-05-27
Payer: MEDICARE

## 2023-05-24 DIAGNOSIS — R93.89 ABNORMAL CHEST CT: ICD-10-CM

## 2023-05-24 PROCEDURE — 71250 CT THORAX DX C-: CPT

## 2023-06-27 ENCOUNTER — TRANSCRIBE ORDERS (OUTPATIENT)
Facility: HOSPITAL | Age: 73
End: 2023-06-27

## 2023-06-27 DIAGNOSIS — Z12.31 VISIT FOR SCREENING MAMMOGRAM: Primary | ICD-10-CM

## 2023-06-29 ENCOUNTER — HOSPITAL ENCOUNTER (OUTPATIENT)
Facility: HOSPITAL | Age: 73
Discharge: HOME | End: 2023-06-29
Payer: MEDICARE

## 2023-06-29 DIAGNOSIS — Z12.31 VISIT FOR SCREENING MAMMOGRAM: ICD-10-CM

## 2023-06-29 PROCEDURE — 77063 BREAST TOMOSYNTHESIS BI: CPT

## 2023-09-05 ENCOUNTER — HOSPITAL ENCOUNTER (OUTPATIENT)
Facility: HOSPITAL | Age: 73
Discharge: HOME | End: 2023-09-08
Payer: MEDICARE

## 2023-09-05 PROCEDURE — 71271 CT THORAX LUNG CANCER SCR C-: CPT

## 2023-09-11 LAB — HBA1C MFR BLD HPLC: 6 %

## 2023-11-30 NOTE — PROGRESS NOTES
CARDIOLOGY OFFICE NOTE    Nahum Holloway MD, [facility] [address], [facility]  Phone [phone]; Fax [phone]  Mobile [phone]   Voice Mail [phone]    Primary care: Doren Kehr, MD       ATTENTION:   This medical record was transcribed using an electronic medical records/speech recognition system. Although proofread, it may and can contain electronic, spelling and other errors. Corrections may be executed at a later time. Please feel free to contact us for any clarifications as needed. Ashlie Torres is a 75 y.o. female with  referred for hypertension and dyslipidemia          Cardiac risk factors: family history, dyslipidemia, diabetes mellitus, hypertension, post-menopausal   I have personally obtained the history from the patient. HISTORY OF PRESENTING ILLNESS    Ms./Mr. Ashlie Torres  75 y.o. is a kandis lady with hypertension, dyslipidemia and significant COPD requiring home oxygen. Shortness of breath is doing well does not not sound like she required any admissions to the hospital.  Blood pressure is elevated in the elevator is not working so she had to walk up the steps. I retook her blood pressure after she sat for a while it was 150/80. Talked with her about going on an additional antihypertensive. We did review her cholesterol profile.          ACTIVE PROBLEM LIST     Patient Active Problem List    Diagnosis Date Noted    Abnormal mammogram with microcalcification 03/23/2015           PAST MEDICAL HISTORY     Past Medical History:   Diagnosis Date    Chronic obstructive pulmonary disease ([address])     COVID-19 07/2020    COVID-19 vaccine series completed 2021    Pfizer    Emphysema lung (HCC)     Hand arthritis     Hypertension     Oxygen dependent     2 L via NC    PVC (premature ventricular contraction)     Followed by Dr. Master Holloway    Renal insufficiency 08/23/2021           PAST SURGICAL HISTORY

## 2023-11-30 NOTE — PATIENT INSTRUCTIONS

## 2023-12-01 ENCOUNTER — OFFICE VISIT (OUTPATIENT)
Age: 73
End: 2023-12-01
Payer: MEDICARE

## 2023-12-01 VITALS
HEART RATE: 85 BPM | BODY MASS INDEX: 24.66 KG/M2 | DIASTOLIC BLOOD PRESSURE: 80 MMHG | SYSTOLIC BLOOD PRESSURE: 160 MMHG | WEIGHT: 134 LBS | OXYGEN SATURATION: 95 % | HEIGHT: 62 IN

## 2023-12-01 DIAGNOSIS — E78.5 DYSLIPIDEMIA: ICD-10-CM

## 2023-12-01 DIAGNOSIS — I10 ESSENTIAL (PRIMARY) HYPERTENSION: Primary | ICD-10-CM

## 2023-12-01 PROCEDURE — G8484 FLU IMMUNIZE NO ADMIN: HCPCS | Performed by: SPECIALIST

## 2023-12-01 PROCEDURE — G8420 CALC BMI NORM PARAMETERS: HCPCS | Performed by: SPECIALIST

## 2023-12-01 PROCEDURE — 3017F COLORECTAL CA SCREEN DOC REV: CPT | Performed by: SPECIALIST

## 2023-12-01 PROCEDURE — G8427 DOCREV CUR MEDS BY ELIG CLIN: HCPCS | Performed by: SPECIALIST

## 2023-12-01 PROCEDURE — 3079F DIAST BP 80-89 MM HG: CPT | Performed by: SPECIALIST

## 2023-12-01 PROCEDURE — G8399 PT W/DXA RESULTS DOCUMENT: HCPCS | Performed by: SPECIALIST

## 2023-12-01 PROCEDURE — 1090F PRES/ABSN URINE INCON ASSESS: CPT | Performed by: SPECIALIST

## 2023-12-01 PROCEDURE — 99214 OFFICE O/P EST MOD 30 MIN: CPT | Performed by: SPECIALIST

## 2023-12-01 PROCEDURE — 1036F TOBACCO NON-USER: CPT | Performed by: SPECIALIST

## 2023-12-01 PROCEDURE — 1123F ACP DISCUSS/DSCN MKR DOCD: CPT | Performed by: SPECIALIST

## 2023-12-01 PROCEDURE — 3077F SYST BP >= 140 MM HG: CPT | Performed by: SPECIALIST

## 2023-12-01 RX ORDER — SODIUM BICARBONATE 325 MG/1
325 TABLET ORAL 4 TIMES DAILY
COMMUNITY

## 2024-02-29 NOTE — PATIENT INSTRUCTIONS

## 2024-03-01 ENCOUNTER — OFFICE VISIT (OUTPATIENT)
Age: 74
End: 2024-03-01
Payer: MEDICARE

## 2024-03-01 VITALS
HEIGHT: 62 IN | OXYGEN SATURATION: 99 % | HEART RATE: 85 BPM | WEIGHT: 134 LBS | BODY MASS INDEX: 24.66 KG/M2 | DIASTOLIC BLOOD PRESSURE: 80 MMHG | SYSTOLIC BLOOD PRESSURE: 122 MMHG

## 2024-03-01 DIAGNOSIS — I10 ESSENTIAL (PRIMARY) HYPERTENSION: Primary | ICD-10-CM

## 2024-03-01 DIAGNOSIS — E78.5 DYSLIPIDEMIA: ICD-10-CM

## 2024-03-01 PROCEDURE — G8420 CALC BMI NORM PARAMETERS: HCPCS | Performed by: SPECIALIST

## 2024-03-01 PROCEDURE — G8427 DOCREV CUR MEDS BY ELIG CLIN: HCPCS | Performed by: SPECIALIST

## 2024-03-01 PROCEDURE — 99214 OFFICE O/P EST MOD 30 MIN: CPT | Performed by: SPECIALIST

## 2024-03-01 PROCEDURE — 93010 ELECTROCARDIOGRAM REPORT: CPT | Performed by: SPECIALIST

## 2024-03-01 PROCEDURE — 1090F PRES/ABSN URINE INCON ASSESS: CPT | Performed by: SPECIALIST

## 2024-03-01 PROCEDURE — 3017F COLORECTAL CA SCREEN DOC REV: CPT | Performed by: SPECIALIST

## 2024-03-01 PROCEDURE — 3074F SYST BP LT 130 MM HG: CPT | Performed by: SPECIALIST

## 2024-03-01 PROCEDURE — 93005 ELECTROCARDIOGRAM TRACING: CPT | Performed by: SPECIALIST

## 2024-03-01 PROCEDURE — G8399 PT W/DXA RESULTS DOCUMENT: HCPCS | Performed by: SPECIALIST

## 2024-03-01 PROCEDURE — 1123F ACP DISCUSS/DSCN MKR DOCD: CPT | Performed by: SPECIALIST

## 2024-03-01 PROCEDURE — 1036F TOBACCO NON-USER: CPT | Performed by: SPECIALIST

## 2024-03-01 PROCEDURE — 3079F DIAST BP 80-89 MM HG: CPT | Performed by: SPECIALIST

## 2024-03-01 PROCEDURE — G8484 FLU IMMUNIZE NO ADMIN: HCPCS | Performed by: SPECIALIST

## 2024-03-01 NOTE — PROGRESS NOTES
Chief Complaint   Patient presents with    Hypertension     Vitals:    24 1007   BP: 122/80   Site: Left Upper Arm   Position: Sitting   Pulse: 85   SpO2: 99%   Weight: 60.8 kg (134 lb)   Height: 1.575 m (5' 2\")     Chest pain: DENIED     Recent hospital stays: DENIED     Refills: DENIED     NAME Stormy NEUMANN    1950      MRN    [id number]      LAST OFFICE APPOINTMENT: 2023     DIAGNOSIS    ICD-10-CM    1. Essential (primary) hypertension  I10 EKG 12 Lead      2. Dyslipidemia  E78.5           HOME MEDICATION  Current Outpatient Medications   Medication Sig    sodium bicarbonate 325 MG tablet Take 1 tablet by mouth 4 times daily    vitamin D 25 MCG (1000 UT) CAPS Take by mouth    azithromycin (ZITHROMAX) 250 MG tablet     OXYGEN 2 each    acetaminophen (TYLENOL) 500 MG tablet Take by mouth every 6 hours as needed    albuterol sulfate HFA (PROVENTIL;VENTOLIN;PROAIR) 108 (90 Base) MCG/ACT inhaler Inhale into the lungs    atorvastatin (LIPITOR) 10 MG tablet Take 1 tablet by mouth nightly    citalopram (CELEXA) 20 MG tablet Take 1 tablet by mouth every evening    fluticasone-umeclidin-vilant (TRELEGY ELLIPTA) 100-62.5-25 MCG/ACT AEPB inhaler Inhale 1 puff into the lungs daily    lisinopril (PRINIVIL;ZESTRIL) 30 MG tablet Take 1 tablet by mouth     No current facility-administered medications for this visit.       VITAL SIGNS  Wt Readings from Last 3 Encounters:   24 60.8 kg (134 lb)   23 60.8 kg (134 lb)   23 61.7 kg (136 lb)     BP Readings from Last 3 Encounters:   24 122/80   23 (!) 160/80   23 122/82     Pulse Readings from Last 3 Encounters:   24 85   23 85   23 72         SPECIALTY COMMENTS  1. Lipids    20- , HDL 61, LDL 77,    21- , HDL 58, LDL 87,    21- , HDL 66, LDL 88, TG 85  3/3/22- , HDL 71, LDL 87, TG 52  9/10/22- , HDL 69, LDL 90, TG 98  23- , HDL 71, LDL 91, 
via NC    PVC (premature ventricular contraction)     Followed by Dr. Rangel    Renal insufficiency 2021           PAST SURGICAL HISTORY     Past Surgical History:   Procedure Laterality Date    ARTHROPLASTY Left     Thumb    BREAST BIOPSY Left 2015    LEFT BREAST BIOPSY WITH ULTRASOUND performed by Tony FAROOQ MD at [facility] MAIN OR    CARPAL TUNNEL RELEASE Right     COLONOSCOPY N/A 2016    COLONOSCOPY performed by Sravan Schuster MD at [facility] ENDOSCOPY    ORTHOPEDIC SURGERY Right     trigger finger release    OTHER SURGICAL HISTORY      Polyps removed from vocal cords    TONSILLECTOMY      TUBAL LIGATION            ALLERGIES     Allergies   Allergen Reactions    Metformin Other (See Comments)     Kidney report occur poor.     Sulfa Antibiotics Itching          FAMILY HISTORY     History reviewed. No pertinent family history. negative for cardiac disease       SOCIAL HISTORY     Social History     Socioeconomic History    Marital status:      Spouse name: None    Number of children: None    Years of education: None    Highest education level: None   Tobacco Use    Smoking status: Former     Current packs/day: 0.00     Types: Cigarettes     Quit date: 2012     Years since quittin.2    Smokeless tobacco: Never   Substance and Sexual Activity    Alcohol use: No    Drug use: No         MEDICATIONS     Current Outpatient Medications   Medication Sig    sodium bicarbonate 325 MG tablet Take 1 tablet by mouth 4 times daily    vitamin D 25 MCG (1000 UT) CAPS Take by mouth    azithromycin (ZITHROMAX) 250 MG tablet     OXYGEN 2 each    acetaminophen (TYLENOL) 500 MG tablet Take by mouth every 6 hours as needed    albuterol sulfate HFA (PROVENTIL;VENTOLIN;PROAIR) 108 (90 Base) MCG/ACT inhaler Inhale into the lungs    atorvastatin (LIPITOR) 10 MG tablet Take 1 tablet by mouth nightly    citalopram (CELEXA) 20 MG tablet Take 1 tablet by mouth every evening

## 2024-08-28 ENCOUNTER — HOSPITAL ENCOUNTER (OUTPATIENT)
Facility: HOSPITAL | Age: 74
Discharge: HOME | End: 2024-08-31
Payer: MEDICARE

## 2024-08-28 DIAGNOSIS — Z87.891 FORMER SMOKER: ICD-10-CM

## 2024-08-28 DIAGNOSIS — Z87.891 PERSONAL HISTORY OF TOBACCO USE: ICD-10-CM

## 2024-08-28 PROCEDURE — 71271 CT THORAX LUNG CANCER SCR C-: CPT

## 2024-10-03 ENCOUNTER — TRANSCRIBE ORDERS (OUTPATIENT)
Facility: HOSPITAL | Age: 74
End: 2024-10-03

## 2024-10-03 DIAGNOSIS — Z12.31 SCREENING MAMMOGRAM FOR BREAST CANCER: Primary | ICD-10-CM

## 2024-10-11 ENCOUNTER — HOSPITAL ENCOUNTER (OUTPATIENT)
Facility: HOSPITAL | Age: 74
Discharge: HOME | End: 2024-10-14
Payer: MEDICARE

## 2024-10-11 DIAGNOSIS — Z12.31 SCREENING MAMMOGRAM FOR BREAST CANCER: ICD-10-CM

## 2024-10-11 PROCEDURE — 77063 BREAST TOMOSYNTHESIS BI: CPT

## 2025-06-22 ENCOUNTER — TRANSCRIBE ORDERS (OUTPATIENT)
Facility: HOSPITAL | Age: 75
End: 2025-06-22

## 2025-06-22 DIAGNOSIS — F17.211 CIGARETTE NICOTINE DEPENDENCE IN REMISSION: ICD-10-CM

## 2025-06-22 DIAGNOSIS — Z87.891 PERSONAL HISTORY OF TOBACCO USE: Primary | ICD-10-CM

## 2025-06-24 ENCOUNTER — TRANSCRIBE ORDERS (OUTPATIENT)
Facility: HOSPITAL | Age: 75
End: 2025-06-24

## 2025-06-24 DIAGNOSIS — J44.9 CHRONIC OBSTRUCTIVE PULMONARY DISEASE, UNSPECIFIED COPD TYPE (HCC): Primary | ICD-10-CM

## 2025-07-08 ENCOUNTER — TRANSCRIBE ORDERS (OUTPATIENT)
Facility: HOSPITAL | Age: 75
End: 2025-07-08

## 2025-07-08 DIAGNOSIS — R06.02 SOB (SHORTNESS OF BREATH): Primary | ICD-10-CM

## 2025-07-18 ENCOUNTER — HOSPITAL ENCOUNTER (OUTPATIENT)
Facility: HOSPITAL | Age: 75
Discharge: HOME | End: 2025-07-21
Payer: MEDICARE

## 2025-07-18 ENCOUNTER — HOSPITAL ENCOUNTER (OUTPATIENT)
Facility: HOSPITAL | Age: 75
Discharge: HOME | End: 2025-07-20
Payer: MEDICARE

## 2025-07-18 DIAGNOSIS — R06.02 SOB (SHORTNESS OF BREATH): ICD-10-CM

## 2025-07-18 DIAGNOSIS — Z87.891 PERSONAL HISTORY OF TOBACCO USE: ICD-10-CM

## 2025-07-18 DIAGNOSIS — F17.211 CIGARETTE NICOTINE DEPENDENCE IN REMISSION: ICD-10-CM

## 2025-07-18 PROCEDURE — 71271 CT THORAX LUNG CANCER SCR C-: CPT

## 2025-07-18 PROCEDURE — 6360000004 HC RX CONTRAST MEDICATION

## 2025-07-18 PROCEDURE — C8929 TTE W OR WO FOL WCON,DOPPLER: HCPCS

## 2025-07-18 RX ADMIN — SULFUR HEXAFLUORIDE 5 ML: 60.7; .19; .19 INJECTION, POWDER, LYOPHILIZED, FOR SUSPENSION INTRAVENOUS; INTRAVESICAL at 13:46

## 2025-07-19 LAB
ECHO AO ROOT DIAM: 2.5 CM
ECHO AV AREA PEAK VELOCITY: 2.5 CM2
ECHO AV AREA VTI: 2.4 CM2
ECHO AV MEAN GRADIENT: 8 MMHG
ECHO AV MEAN VELOCITY: 1.3 M/S
ECHO AV PEAK GRADIENT: 12 MMHG
ECHO AV PEAK VELOCITY: 1.7 M/S
ECHO AV VELOCITY RATIO: 1.12
ECHO AV VTI: 31.5 CM
ECHO EST RA PRESSURE: 3 MMHG
ECHO IVC EXP: 0.6 CM
ECHO LA AREA 2C: 7.8 CM2
ECHO LA AREA 4C: 12.3 CM2
ECHO LA MAJOR AXIS: 4.6 CM
ECHO LA MINOR AXIS: 3.8 CM
ECHO LA VOL BP: 20 ML (ref 22–52)
ECHO LA VOL MOD A2C: 12 ML (ref 22–52)
ECHO LA VOL MOD A4C: 27 ML (ref 22–52)
ECHO LV E' LATERAL VELOCITY: 7.51 CM/S
ECHO LV E' SEPTAL VELOCITY: 8.92 CM/S
ECHO LV EDV A2C: 82 ML
ECHO LV EDV A4C: 90 ML
ECHO LV EF PHYSICIAN: 60 %
ECHO LV EJECTION FRACTION A2C: 66 %
ECHO LV EJECTION FRACTION A4C: 70 %
ECHO LV EJECTION FRACTION BIPLANE: 68 % (ref 55–100)
ECHO LV ESV A2C: 28 ML
ECHO LV ESV A4C: 27 ML
ECHO LV FRACTIONAL SHORTENING: 45 % (ref 28–44)
ECHO LV INTERNAL DIMENSION DIASTOLIC: 4.2 CM (ref 3.9–5.3)
ECHO LV INTERNAL DIMENSION SYSTOLIC: 2.3 CM
ECHO LV IVSD: 0.8 CM (ref 0.6–0.9)
ECHO LV MASS 2D: 118.7 G (ref 67–162)
ECHO LV POSTERIOR WALL DIASTOLIC: 1 CM (ref 0.6–0.9)
ECHO LV RELATIVE WALL THICKNESS RATIO: 0.48
ECHO LVOT AREA: 2.3 CM2
ECHO LVOT AV VTI INDEX: 1.05
ECHO LVOT DIAM: 1.7 CM
ECHO LVOT MEAN GRADIENT: 6 MMHG
ECHO LVOT PEAK GRADIENT: 14 MMHG
ECHO LVOT PEAK VELOCITY: 1.9 M/S
ECHO LVOT SV: 74.9 ML
ECHO LVOT VTI: 33 CM
ECHO MV A VELOCITY: 0.78 M/S
ECHO MV AREA VTI: 2.9 CM2
ECHO MV E DECELERATION TIME (DT): 290 MS
ECHO MV E VELOCITY: 0.63 M/S
ECHO MV E/A RATIO: 0.81
ECHO MV E/E' LATERAL: 8.39
ECHO MV E/E' RATIO (AVERAGED): 7.73
ECHO MV E/E' SEPTAL: 7.06
ECHO MV LVOT VTI INDEX: 0.77
ECHO MV MAX VELOCITY: 0.9 M/S
ECHO MV MEAN GRADIENT: 2 MMHG
ECHO MV MEAN VELOCITY: 0.7 M/S
ECHO MV PEAK GRADIENT: 4 MMHG
ECHO MV VTI: 25.5 CM
ECHO RA AREA 4C: 7 CM2
ECHO RA VOLUME: 12 ML
ECHO RV BASAL DIMENSION: 2.7 CM
ECHO RV FREE WALL PEAK S': 12.9 CM/S
ECHO RV TAPSE: 1.7 CM (ref 1.7–?)

## 2025-07-27 ENCOUNTER — TRANSCRIBE ORDERS (OUTPATIENT)
Facility: HOSPITAL | Age: 75
End: 2025-07-27

## 2025-07-27 DIAGNOSIS — R91.1 COIN LESION: Primary | ICD-10-CM

## 2025-08-09 ENCOUNTER — HOSPITAL ENCOUNTER (OUTPATIENT)
Facility: HOSPITAL | Age: 75
Discharge: HOME | End: 2025-08-12
Payer: MEDICARE

## 2025-08-09 DIAGNOSIS — R91.1 COIN LESION: ICD-10-CM

## 2025-08-09 PROCEDURE — A9609 HC RX DIAGNOSTIC RADIOPHARMACEUTICAL: HCPCS | Performed by: NURSE PRACTITIONER

## 2025-08-09 PROCEDURE — 3430000000 HC RX DIAGNOSTIC RADIOPHARMACEUTICAL: Performed by: NURSE PRACTITIONER

## 2025-08-09 PROCEDURE — 78815 PET IMAGE W/CT SKULL-THIGH: CPT

## 2025-08-09 RX ORDER — FLUDEOXYGLUCOSE F-18 500 MCI/ML
10.46 INJECTION INTRAVENOUS
Status: COMPLETED | OUTPATIENT
Start: 2025-08-09 | End: 2025-08-09

## 2025-08-09 RX ADMIN — FLUDEOXYGLUCOSE F-18 10.46 MILLICURIE: 500 INJECTION INTRAVENOUS at 13:40

## (undated) DEVICE — SPONGE GZ W4XL4IN COT 12 PLY TYP VII WVN C FLD DSGN

## (undated) DEVICE — SUTURE PDS II SZ 0 L36IN ABSRB VLT L40MM CT 1/2 CIR Z358T

## (undated) DEVICE — SUTURE ETHLN SZ 4-0 L18IN NONABSORBABLE BLK L19MM PS-2 3/8 1667H

## (undated) DEVICE — INTENDED FOR TISSUE SEPARATION, AND OTHER PROCEDURES THAT REQUIRE A SHARP SURGICAL BLADE TO PUNCTURE OR CUT.: Brand: BARD-PARKER ® CARBON RIB-BACK BLADES

## (undated) DEVICE — BLADE SURG 90DEG BVL UP LAMLLR

## (undated) DEVICE — BLADE, TONGUE, 6", STERILE: Brand: MEDLINE

## (undated) DEVICE — DRESSING GZ FLUF 36X36 IN RND 2 PLY PD GZ AMER WHT CROSS

## (undated) DEVICE — GLOVE ORANGE PI 7   MSG9070

## (undated) DEVICE — SUTURE FIBERWIRE 4-0 L18IN NONABSORBABLE BLU L12.3MM 3/8 AR723001

## (undated) DEVICE — BLADE SAW W5.5XL25MM THK0.38MM CUT THK0.43MM REPL S STL SAG

## (undated) DEVICE — SOL IRRIGATION INJ NACL 0.9% 500ML BTL

## (undated) DEVICE — GLOVE SURG SZ 65 THK91MIL LTX FREE SYN POLYISOPRENE

## (undated) DEVICE — SUT SLK 2-0SH 30IN BLK --

## (undated) DEVICE — GLOVE ORTHO 8   MSG9480

## (undated) DEVICE — ZIMMER® STERILE DISPOSABLE TOURNIQUET CUFF WITH PROTECTIVE SLEEVE AND PLC, DUAL PORT, SINGLE BLADDER, 18 IN. (46 CM)

## (undated) DEVICE — HAND-SFMCASU: Brand: MEDLINE INDUSTRIES, INC.

## (undated) DEVICE — BLADE OPHTH 180DEG CUT SURF BLU STR SHRP DBL BVL GRINDLESS

## (undated) DEVICE — SUTURE FIBERWIRE 3-0 L18IN NONABSORBABLE BLU L15MM 3/8 CIR AR722701